# Patient Record
Sex: MALE | Race: WHITE | Employment: OTHER | URBAN - NONMETROPOLITAN AREA
[De-identification: names, ages, dates, MRNs, and addresses within clinical notes are randomized per-mention and may not be internally consistent; named-entity substitution may affect disease eponyms.]

---

## 2016-12-22 PROCEDURE — G0179 MD RECERTIFICATION HHA PT: HCPCS | Performed by: FAMILY MEDICINE

## 2017-01-05 ENCOUNTER — TELEPHONE (OUTPATIENT)
Dept: FAMILY MEDICINE CLINIC | Age: 47
End: 2017-01-05

## 2017-01-05 DIAGNOSIS — L40.9 PSORIASIS: ICD-10-CM

## 2017-01-05 DIAGNOSIS — Q90.9 TRISOMY 21: Primary | ICD-10-CM

## 2017-01-05 DIAGNOSIS — L71.9 ROSACEA: ICD-10-CM

## 2017-01-05 DIAGNOSIS — L30.9 ECZEMA, UNSPECIFIED TYPE: ICD-10-CM

## 2017-02-20 PROCEDURE — G0179 MD RECERTIFICATION HHA PT: HCPCS | Performed by: FAMILY MEDICINE

## 2017-03-06 ENCOUNTER — TELEPHONE (OUTPATIENT)
Dept: FAMILY MEDICINE CLINIC | Age: 47
End: 2017-03-06

## 2017-03-08 ENCOUNTER — TELEPHONE (OUTPATIENT)
Dept: FAMILY MEDICINE CLINIC | Age: 47
End: 2017-03-08

## 2017-03-08 DIAGNOSIS — Q90.9 TRISOMY 21: Primary | ICD-10-CM

## 2019-10-07 ENCOUNTER — HOSPITAL ENCOUNTER (EMERGENCY)
Age: 49
Discharge: HOME | End: 2019-10-07
Attending: EMERGENCY MEDICINE
Payer: MEDICARE

## 2019-10-07 VITALS
TEMPERATURE: 97.5 F | OXYGEN SATURATION: 98 % | HEART RATE: 79 BPM | DIASTOLIC BLOOD PRESSURE: 72 MMHG | SYSTOLIC BLOOD PRESSURE: 121 MMHG | RESPIRATION RATE: 12 BRPM

## 2019-10-07 DIAGNOSIS — Z00.00 ENCOUNTER FOR PHYSICAL EXAMINATION: Primary | ICD-10-CM

## 2019-10-07 PROCEDURE — 99282 EMERGENCY DEPT VISIT SF MDM: CPT

## 2019-10-07 RX ORDER — MELALEUCA QUINQUENERVIA POLLEN 0.05 G/ML
INJECTION, SOLUTION SUBCUTANEOUS
COMMUNITY

## 2019-10-07 RX ORDER — LORATADINE 10 MG/1
10 CAPSULE, LIQUID FILLED ORAL DAILY
COMMUNITY

## 2019-10-07 RX ORDER — ASCORBIC ACID 500 MG
500 TABLET ORAL DAILY
COMMUNITY

## 2019-10-07 RX ORDER — DESONIDE 0.5 MG/G
CREAM TOPICAL 2 TIMES DAILY
COMMUNITY

## 2019-10-07 RX ORDER — PANTOPRAZOLE SODIUM 40 MG/1
40 TABLET, DELAYED RELEASE ORAL DAILY
COMMUNITY

## 2020-03-13 ENCOUNTER — HOSPITAL ENCOUNTER (OUTPATIENT)
Dept: AUDIOLOGY | Age: 50
Discharge: HOME | End: 2020-03-13
Payer: MEDICARE

## 2020-03-13 PROCEDURE — 92557 COMPREHENSIVE HEARING TEST: CPT | Performed by: AUDIOLOGIST

## 2020-03-13 PROCEDURE — 92567 TYMPANOMETRY: CPT | Performed by: AUDIOLOGIST

## 2020-03-13 PROCEDURE — V5014 HEARING AID REPAIR/MODIFYING: HCPCS | Performed by: AUDIOLOGIST

## 2020-03-13 NOTE — PROGRESS NOTES
AUDIOLOGICAL EVALUATION      REASON FOR TESTING:  Longstanding hearing loss- previously seen for hearing aids at [facility]. Wears IPICO 3 Series 30 Power Plus BTE hearing aids that were fitted 3/26/2015. Patient has not been wearing his hearing aids- earhooks fall off of the hearing aids. Earmolds are loose. Patient lives in a group home. Down Syndrome. OTOSCOPY: Very narrow external auditory canals for both ears. Possible cerumen deep in the EAC of the right ear. AUDIOGRAM        Reliability: Good  Audiometer Used:  GSI-61    COMMENTS: Severe to profound hearing loss for the right ear. Profound hearing loss for the left ear. Unmasked bone conduction thresholds suggest a possible conductive component for at least one ear. Could not test speech discrimination due to patient's articulation errors. Tympanometry revealed reduced middle ear compliance and slight negative middle ear pressure for the left ear. Tympanometry revealed a flat tympanogram for the right ear. RECOMMENDATION(S):   1- ENT consult for medical clearance for hearing aids/possible cerumen removal for the right ear. Today's results will be shared with the [facility] ENT APRN/ALEX to determine if an appointment will be needed with Dr. Coty Darling for an ear exam under microscope due to the size of the patient's ear canals. A same day appointment will be scheduled for bilateral earmold impressions following cerumen removal.  2- A listening check of the patient's hearing aids revealed normal output. Replaced earmold tubing. Will take the hearing aids to [facility] to see if the appropriate earhooks are in stock; if not, they will be ordered. Will contact the supervisor at [place] [id number]. to  the hearing aids from [facility] when they are ready. 3- Repeat audiogram and tympanogram following any medical intervention. Audiometry should be completed on an annual basis.

## 2020-03-13 NOTE — LETTER
[facility], [facility]  [address], [address]   Phone: [phone]    Parker Velez        March 13, 2020     [place]  [place] [address]    Patient: Ro Mahmood   MR Number: [id number]   YOB: 1970   Date of Visit: 3/13/2020       Dear Dr. Tripp Rothman:    Thank you for referring Shmuel Mahmood to me for evaluation. Below are the relevant portions of my assessment and plan of care. AUDIOLOGICAL EVALUATION      REASON FOR TESTING:  Longstanding hearing loss- previously seen for hearing aids at [facility]. Wears Evino 3 Series 30 Power Plus BTE hearing aids that were fitted 3/26/2015. Patient has not been wearing his hearing aids- earhooks fall off of the hearing aids. Earmolds are loose. Patient lives in a group home. Down Syndrome. OTOSCOPY: Very narrow external auditory canals for both ears. Possible cerumen deep in the EAC of the right ear. AUDIOGRAM        Reliability: Good  Audiometer Used:  GSI-61    COMMENTS: Severe to profound hearing loss for the right ear. Profound hearing loss for the left ear. Unmasked bone conduction thresholds suggest a possible conductive component for at least one ear. Could not test speech discrimination due to patient's articulation errors. Tympanometry revealed reduced middle ear compliance and slight negative middle ear pressure for the left ear. Tympanometry revealed a flat tympanogram for the right ear. RECOMMENDATION(S):   1- ENT consult for medical clearance for hearing aids/possible cerumen removal for the right ear. Today's results will be shared with the [facility] ENT APRN/ALEX to determine if an appointment will be needed with Dr. Danica Martinez for an ear exam under microscope due to the size of the patient's ear canals.  A same day appointment will be scheduled for bilateral earmold impressions following cerumen removal. 2- A listening check of the patient's hearing aids revealed normal output. Replaced earmold tubing. Will take the hearing aids to [facility] to see if the appropriate earhooks are in stock; if not, they will be ordered. Will contact the supervisor at [address] to  the hearing aids from [facility] when they are ready. 3- Repeat audiogram and tympanogram following any medical intervention. Audiometry should be completed on an annual basis. If you have questions, please do not hesitate to call me. I look forward to following Rubin along with you.     Sincerely,          Keira Menard

## 2020-03-17 ENCOUNTER — TELEPHONE (OUTPATIENT)
Dept: AUDIOLOGY | Age: 50
End: 2020-03-17

## 2020-06-25 ENCOUNTER — OFFICE VISIT (OUTPATIENT)
Dept: ENT CLINIC | Age: 50
End: 2020-06-25
Payer: MEDICARE

## 2020-06-25 ENCOUNTER — HOSPITAL ENCOUNTER (OUTPATIENT)
Dept: AUDIOLOGY | Age: 50
Discharge: HOME | End: 2020-06-25
Payer: MEDICARE

## 2020-06-25 VITALS
WEIGHT: 183.5 LBS | HEART RATE: 76 BPM | TEMPERATURE: 98.3 F | BODY MASS INDEX: 31.75 KG/M2 | SYSTOLIC BLOOD PRESSURE: 112 MMHG | DIASTOLIC BLOOD PRESSURE: 72 MMHG | RESPIRATION RATE: 16 BRPM

## 2020-06-25 PROCEDURE — 99203 OFFICE O/P NEW LOW 30 MIN: CPT | Performed by: OTOLARYNGOLOGY

## 2020-06-25 PROCEDURE — 9990000010 HC NO CHARGE VISIT: Performed by: AUDIOLOGIST

## 2020-06-25 PROCEDURE — 92504 EAR MICROSCOPY EXAMINATION: CPT | Performed by: OTOLARYNGOLOGY

## 2020-06-25 RX ORDER — ACETAMINOPHEN 500 MG
500 TABLET ORAL EVERY 6 HOURS PRN
COMMUNITY

## 2020-06-25 RX ORDER — CALCIUM CARBONATE 200(500)MG
2 TABLET,CHEWABLE ORAL PRN
COMMUNITY

## 2020-06-25 RX ORDER — ONDANSETRON HYDROCHLORIDE 8 MG/1
8 TABLET, FILM COATED ORAL EVERY 8 HOURS PRN
COMMUNITY

## 2020-06-25 RX ORDER — CHOLECALCIFEROL (VITAMIN D3) 125 MCG
5 CAPSULE ORAL DAILY
COMMUNITY

## 2020-06-25 RX ORDER — TRAZODONE HYDROCHLORIDE 150 MG/1
150 TABLET ORAL NIGHTLY
COMMUNITY

## 2020-06-25 ASSESSMENT — ENCOUNTER SYMPTOMS
VOICE CHANGE: 0
COUGH: 0
SHORTNESS OF BREATH: 0
TROUBLE SWALLOWING: 0
SORE THROAT: 0
CHEST TIGHTNESS: 0
RHINORRHEA: 0
CHOKING: 0
DIARRHEA: 0
STRIDOR: 0
WHEEZING: 0
ABDOMINAL PAIN: 0
SINUS PRESSURE: 0
COLOR CHANGE: 0
NAUSEA: 0
VOMITING: 0
APNEA: 0
FACIAL SWELLING: 0

## 2020-06-25 NOTE — PROGRESS NOTES
tablet Take 40 mg by mouth daily      vitamin C (ASCORBIC ACID) 500 MG tablet Take 500 mg by mouth daily      desonide (DESOWEN) 0.05 % cream Apply topically 2 times daily Apply topically 2 times daily.  Hydrocortisone ( PSORIASIS ANIT-ITCH EX) Apply topically      triamcinolone (KENALOG) 0.1 % ointment Apply topically 2 times daily Apply topically 2 times daily.  Melaleuca 1:20 SOLN Inject into the skin Applied to pimples. Topical ointment      levothyroxine (SYNTHROID) 50 MCG tablet Take 1 tablet by mouth Daily 30 tablet 11    Multiple Vitamins-Minerals (SYDNEY MULTIVITAMIN FOR MEN PO) Take  by mouth daily. Current Facility-Administered Medications   Medication Dose Route Frequency Provider Last Rate Last Dose    ondansetron (ZOFRAN-ODT) disintegrating tablet 8 mg  8 mg Oral Once GILES Ortega - CNP         Past Medical History:   Diagnosis Date    Eczema     History of hemorrhoids     Psoriasis     scalp mostly    Rosacea     [place] dermatologist    Trisomy 21       Past Surgical History:   Procedure Laterality Date    HEMORRHOID SURGERY  December 2004    Dr. Gautam Canjilon      x2    MIDDLE EAR SURGERY      TONSILLECTOMY       Family History   Problem Relation Age of Onset    Heart Disease Mother     High Blood Pressure Mother     Diabetes Mother     Thyroid Disease Mother      Social History     Tobacco Use    Smoking status: Never Smoker    Smokeless tobacco: Never Used   Substance Use Topics    Alcohol use: No     Alcohol/week: 0.0 standard drinks       Subjective:      Review of Systems   Constitutional: Negative for activity change, appetite change, chills, diaphoresis, fatigue, fever and unexpected weight change. HENT: Positive for hearing loss.  Negative for congestion, dental problem, ear discharge, ear pain, facial swelling, mouth sores, nosebleeds, postnasal drip, rhinorrhea, sinus pressure, sneezing, sore throat, tinnitus, trouble swallowing and clean at least the right ear. He would likely benefit from a canal plasty. Given the difficulty of that procedure, the patient's abnormal anatomy given his trisomy 24 and the impaired healing of trisomy 24, I would probably defer to an otologist.  He needs to stop the Cortisporin otic solution since it predisposes to fungal infections long-term. Return for evaluation of his cerumen impactions and possible cleaning         Charles R. Montie Lesch, MD    **This report has been created using voice recognition software. It may contain minor errors which are inherent in voicerecognition technology. **

## 2020-06-25 NOTE — PROGRESS NOTES
Patient was here today for ear cleaning with Dr. Laura Gilmore and FER with me following. Dr. Laura Gilmore was unable to remove the wax from the right ear. He recommended Debrox drops and will be seeing him in 10 days (7/7) to repeat cleaning. I informed his caregiver that it is okay to stop in to Audiology after his ear cleaning for earmold impression. I will work him in. Also, the caregiver and I discussed new hearing aids for more power and bluetooth connectivity.  I will submit Medicaid PA request.

## 2020-07-07 ENCOUNTER — OFFICE VISIT (OUTPATIENT)
Dept: ENT CLINIC | Age: 50
End: 2020-07-07
Payer: MEDICARE

## 2020-07-07 VITALS
HEART RATE: 70 BPM | SYSTOLIC BLOOD PRESSURE: 116 MMHG | BODY MASS INDEX: 31.83 KG/M2 | TEMPERATURE: 99.1 F | DIASTOLIC BLOOD PRESSURE: 82 MMHG | RESPIRATION RATE: 16 BRPM | WEIGHT: 184 LBS

## 2020-07-07 PROCEDURE — 99213 OFFICE O/P EST LOW 20 MIN: CPT | Performed by: OTOLARYNGOLOGY

## 2020-07-07 PROCEDURE — 92504 EAR MICROSCOPY EXAMINATION: CPT | Performed by: OTOLARYNGOLOGY

## 2020-07-07 ASSESSMENT — ENCOUNTER SYMPTOMS
TROUBLE SWALLOWING: 0
ABDOMINAL PAIN: 0
CHOKING: 0
FACIAL SWELLING: 0
NAUSEA: 0
CHEST TIGHTNESS: 0
COLOR CHANGE: 0
SORE THROAT: 0
SHORTNESS OF BREATH: 0
WHEEZING: 0
SINUS PRESSURE: 0
VOICE CHANGE: 0
VOMITING: 0
DIARRHEA: 0
APNEA: 0
COUGH: 0
STRIDOR: 0
RHINORRHEA: 0

## 2020-07-07 NOTE — LETTER
[address] [facility] and [address]  Phone: [phone]  Fax: [address], [place]        July 7, 2020    Jose Ganga  [address] [address]    Patient: David Mahmood   MR Number: [id number]   YOB: 1970   Date of Visit: 7/7/2020     Dear Ellis Riddle,    I recently saw your patient, Rubin LIRIANO March, regarding his ears. Making some progress on the right ear. The left ear canal is proven to be completely atretic. This must be further investigated. Please let me know if he have any old CTs anywhere of his head or temporal bones, or any information on the surgery on his right ear. Below are the relevant portions of my assessment and plan of care. Assessment & Plan   Diagnoses and all orders for this visit:     Diagnosis Orders   1. Stenosis of right external auditory canal     2. Bilateral impacted cerumen     3. Trisomy 21     4. Atresia of left external auditory canal         The findings were explained and his questions were answered. I suspect with time will be able to get the wax out of the right ear but the left ear is going to require surgical intervention, depending on findings on the temporal bone CT. We should get as much wax out of the right ear as possible before getting the scan. I will discuss it further on his return visit. He is to continue the Debrox twice a day in the right ear only, until return visit    Note that the left ear canal must be presumed to be full of wax and squamous debris, unless there is significant scar tissue completely obliterating the canal medially    Return in about 1 week (around 7/14/2020) for Follow-Up cerumen impactions, canal narrowing. If you have questions, please do not hesitate to call me. I look forward to following Rubin along with you.     Sincerely,          Brook Ny MD

## 2020-07-07 NOTE — PROGRESS NOTES
[facility] PROFESSIONAL SERVS  [facility] EAR, NOSE AND THROAT  [address] [place] [address]  Dept: [phone]  Dept Fax: [phone]  Loc: [phone]    Rubin Mahmood is a 48 y.o. male who was referred byNo ref. provider found for:  Chief Complaint   Patient presents with    Follow-up     Here for 10 day follow up to check ears   . HPI:     Eliseo Mahmood is a 48 y.o. male who presents today for removal of cerumen impactions after 10 days of bilateral installation of Debrox. His symptoms have not changed at all. History: Allergies   Allergen Reactions    Cephalosporins     Pcn [Penicillins]     Tomato      Current Outpatient Medications   Medication Sig Dispense Refill    traZODone (DESYREL) 150 MG tablet Take 150 mg by mouth nightly      melatonin 5 MG TABS tablet Take 5 mg by mouth daily      neomycin-polymyxin-hydrocortisone (CORTISPORIN) 3.5-02346-0 otic solution Place 4 drops into both ears 2 times daily      calcium carbonate (TUMS) 500 MG chewable tablet Take 2 tablets by mouth as needed for Heartburn      acetaminophen (TYLENOL) 500 MG tablet Take 500 mg by mouth every 6 hours as needed for Pain      ondansetron (ZOFRAN) 8 MG tablet Take 8 mg by mouth every 8 hours as needed for Nausea or Vomiting      Mouthwashes (BIOTENE DRY MOUTH MT) Take by mouth as needed      carbamide peroxide (DEBROX) 6.5 % otic solution Place 3 drops into both ears daily 1 Bottle 3    loratadine (CLARITIN) 10 MG capsule Take 10 mg by mouth daily      pantoprazole (PROTONIX) 40 MG tablet Take 40 mg by mouth daily      vitamin C (ASCORBIC ACID) 500 MG tablet Take 500 mg by mouth daily      desonide (DESOWEN) 0.05 % cream Apply topically 2 times daily Apply topically 2 times daily.  Hydrocortisone ( PSORIASIS ANIT-ITCH EX) Apply topically      triamcinolone (KENALOG) 0.1 % ointment Apply topically 2 times daily Apply topically 2 times daily.       Melaleuca 1:20 SOLN Inject into the skin Applied to pimples. Topical ointment      levothyroxine (SYNTHROID) 50 MCG tablet Take 1 tablet by mouth Daily 30 tablet 11    Multiple Vitamins-Minerals (SYDNEY MULTIVITAMIN FOR MEN PO) Take  by mouth daily. Current Facility-Administered Medications   Medication Dose Route Frequency Provider Last Rate Last Dose    ondansetron (ZOFRAN-ODT) disintegrating tablet 8 mg  8 mg Oral Once Dionicio Young, APRN - CNP         Past Medical History:   Diagnosis Date    Eczema     History of hemorrhoids     Psoriasis     scalp mostly    Rosacea     [place] dermatologist    Trisomy 21       Past Surgical History:   Procedure Laterality Date    HEMORRHOID SURGERY  December 2004    Dr. Marimar Sidhu      x2    MIDDLE EAR SURGERY      TONSILLECTOMY       Family History   Problem Relation Age of Onset    Heart Disease Mother     High Blood Pressure Mother     Diabetes Mother     Thyroid Disease Mother      Social History     Tobacco Use    Smoking status: Never Smoker    Smokeless tobacco: Never Used   Substance Use Topics    Alcohol use: No     Alcohol/week: 0.0 standard drinks       Subjective:      Review of Systems   Constitutional: Negative for activity change, appetite change, chills, diaphoresis, fatigue, fever and unexpected weight change. HENT: Negative for congestion, dental problem, ear discharge, ear pain, facial swelling, hearing loss, mouth sores, nosebleeds, postnasal drip, rhinorrhea, sinus pressure, sneezing, sore throat, tinnitus, trouble swallowing and voice change. Eyes: Negative for visual disturbance. Respiratory: Negative for apnea, cough, choking, chest tightness, shortness of breath, wheezing and stridor. Cardiovascular: Negative for chest pain, palpitations and leg swelling. Gastrointestinal: Negative for abdominal pain, diarrhea, nausea and vomiting. Endocrine: Negative for cold intolerance, heat intolerance, polydipsia and polyuria. Genitourinary: Negative for dysuria, enuresis and hematuria. Musculoskeletal: Negative for arthralgias, gait problem, neck pain and neck stiffness. Skin: Negative for color change and rash. Allergic/Immunologic: Negative for environmental allergies, food allergies and immunocompromised state. Neurological: Negative for dizziness, syncope, facial asymmetry, speech difficulty, light-headedness and headaches. Hematological: Negative for adenopathy. Does not bruise/bleed easily. Psychiatric/Behavioral: Negative for confusion and sleep disturbance. The patient is not nervous/anxious. Objective:   /82 (Site: Right Upper Arm, Position: Sitting)   Pulse 70   Temp 99.1 °F (37.3 °C)   Resp 16   Wt 184 lb (83.5 kg)   BMI 31.83 kg/m²     Physical Exam  Binocular microscopy performed on the left ear. There is no opening at the medial extent of the ear canal.  Based on relative distances between the 2 ears, the stenosis is really an atresia, approximately mid canal    Right ear is examined under the microscope. The narrowest point of the right ear canal is approximately the size of a #7 suction. Only a small amount of wax could be removed with suction. It will now be much more feasible to get the Debrox into the ear canal.  Until that wax was cleaned away, it was completely obstructed. Data:  All of the past medical history, past surgical history, family history,social history, allergies and current medications were reviewed with the patient. Assessment & Plan   Diagnoses and all orders for this visit:     Diagnosis Orders   1. Stenosis of right external auditory canal     2. Bilateral impacted cerumen     3. Trisomy 21     4. Atresia of left external auditory canal         The findings were explained and his questions were answered.   I suspect with time will be able to get the wax out of the right ear but the left ear is going to require surgical intervention, depending on findings on the temporal bone CT. We should get as much wax out of the right ear as possible before getting the scan. I will discuss it further on his return visit. He is to continue the Debrox twice a day in the right ear only, until return visit    Note that the left ear canal must be presumed to be full of wax and squamous debris, unless there is significant scar tissue completely obliterating the canal medially    Return in about 1 week (around 7/14/2020) for Follow-Up cerumen impactions, canal narrowing. Colin Santos. Lilibeth Morales MD    **This report has been created using voice recognition software. It may contain minor errors which are inherent in voicerecognition technology. **

## 2020-07-17 ENCOUNTER — OFFICE VISIT (OUTPATIENT)
Dept: ENT CLINIC | Age: 50
End: 2020-07-17
Payer: MEDICARE

## 2020-07-17 VITALS
RESPIRATION RATE: 16 BRPM | HEART RATE: 68 BPM | WEIGHT: 186 LBS | SYSTOLIC BLOOD PRESSURE: 116 MMHG | DIASTOLIC BLOOD PRESSURE: 64 MMHG | TEMPERATURE: 99.9 F | BODY MASS INDEX: 32.18 KG/M2

## 2020-07-17 PROCEDURE — 99213 OFFICE O/P EST LOW 20 MIN: CPT | Performed by: OTOLARYNGOLOGY

## 2020-07-17 PROCEDURE — 69210 REMOVE IMPACTED EAR WAX UNI: CPT | Performed by: OTOLARYNGOLOGY

## 2020-07-17 ASSESSMENT — ENCOUNTER SYMPTOMS
COLOR CHANGE: 0
WHEEZING: 0
NAUSEA: 0
COUGH: 0
SHORTNESS OF BREATH: 0
TROUBLE SWALLOWING: 0
APNEA: 0
CHEST TIGHTNESS: 0
CHOKING: 0
DIARRHEA: 0
STRIDOR: 0
VOICE CHANGE: 0
ABDOMINAL PAIN: 0
SORE THROAT: 0
FACIAL SWELLING: 0
SINUS PRESSURE: 0
RHINORRHEA: 0
VOMITING: 0

## 2020-07-17 NOTE — PROGRESS NOTES
[facility] PROFESSIONAL SERVS  [facility] EAR, NOSE AND THROAT  [address] [place] [address]  Dept: [phone]  Dept Fax: [phone]  Loc: [phone]    Rubin Mahmood is a 48 y.o. male who was referred byNo ref. provider found for:  Chief Complaint   Patient presents with    Follow-up     Patient is here for follow up for cerumen impaction. Woo Paulino HPI:     Rubin Mahmood is a 48 y.o. male who presents today for follow up cerumen impaction, canal narrowing. He did use the drops to soften his earwax. .      History: Allergies   Allergen Reactions    Cephalosporins     Pcn [Penicillins]     Tomato      Current Outpatient Medications   Medication Sig Dispense Refill    traZODone (DESYREL) 150 MG tablet Take 150 mg by mouth nightly      melatonin 5 MG TABS tablet Take 5 mg by mouth daily      neomycin-polymyxin-hydrocortisone (CORTISPORIN) 3.5-74055-6 otic solution Place 4 drops into both ears 2 times daily      calcium carbonate (TUMS) 500 MG chewable tablet Take 2 tablets by mouth as needed for Heartburn      acetaminophen (TYLENOL) 500 MG tablet Take 500 mg by mouth every 6 hours as needed for Pain      ondansetron (ZOFRAN) 8 MG tablet Take 8 mg by mouth every 8 hours as needed for Nausea or Vomiting      Mouthwashes (BIOTENE DRY MOUTH MT) Take by mouth as needed      carbamide peroxide (DEBROX) 6.5 % otic solution Place 3 drops into both ears daily 1 Bottle 3    loratadine (CLARITIN) 10 MG capsule Take 10 mg by mouth daily      pantoprazole (PROTONIX) 40 MG tablet Take 40 mg by mouth daily      vitamin C (ASCORBIC ACID) 500 MG tablet Take 500 mg by mouth daily      desonide (DESOWEN) 0.05 % cream Apply topically 2 times daily Apply topically 2 times daily.  Hydrocortisone ( PSORIASIS ANIT-ITCH EX) Apply topically      triamcinolone (KENALOG) 0.1 % ointment Apply topically 2 times daily Apply topically 2 times daily.       Melaleuca 1:20 SOLN Genitourinary: Negative for dysuria, enuresis and hematuria. Musculoskeletal: Negative for arthralgias, gait problem, neck pain and neck stiffness. Skin: Negative for color change and rash. Allergic/Immunologic: Negative for environmental allergies, food allergies and immunocompromised state. Neurological: Negative for dizziness, syncope, facial asymmetry, speech difficulty, light-headedness and headaches. Hematological: Negative for adenopathy. Does not bruise/bleed easily. Psychiatric/Behavioral: Negative for confusion and sleep disturbance. The patient is not nervous/anxious. Objective:     /64 (Site: Left Upper Arm, Position: Sitting)   Pulse 68   Temp 99.9 °F (37.7 °C) (Infrared)   Resp 16   Wt 186 lb (84.4 kg)   BMI 32.18 kg/m²     Physical Exam    Cerumen removal using operating microscope,   Under the operating microscope, the right ear was cleaned with wire loop, forceps and suction as needed. Patient tolerated it well. Findings: Incomplete removal.  The wax was densely adherent medially. The opening in the canal is roughly the diameter of a #7 French suction    Data:  All of the past medical history, past surgical history, family history,social history, allergies and current medications were reviewed with the patient. Assessment & Plan   Diagnoses and all orders for this visit:     Diagnosis Orders   1. Stenosis of both external auditory canals  MD REMOVAL IMPACTED CERUMEN INSTRUMENTATION UNILAT   2. Impacted cerumen of right ear  MD REMOVAL IMPACTED CERUMEN INSTRUMENTATION UNILAT   3. Atresia of left external auditory canal  MD REMOVAL IMPACTED CERUMEN INSTRUMENTATION UNILAT   4. Trisomy 21         The findings were explained and his questions were answered. Options were discussed including continuing the debrox while lying down 2 times a day. 1 week follow up. Krish HALL CMA (AAMA), am scribing for, and in the presence of Dr. Nancy Lugo.

## 2020-07-24 ENCOUNTER — OFFICE VISIT (OUTPATIENT)
Dept: ENT CLINIC | Age: 50
End: 2020-07-24
Payer: MEDICARE

## 2020-07-24 VITALS
TEMPERATURE: 98.1 F | HEART RATE: 72 BPM | RESPIRATION RATE: 16 BRPM | BODY MASS INDEX: 33.86 KG/M2 | HEIGHT: 62 IN | DIASTOLIC BLOOD PRESSURE: 68 MMHG | WEIGHT: 184 LBS | SYSTOLIC BLOOD PRESSURE: 110 MMHG

## 2020-07-24 PROCEDURE — 99213 OFFICE O/P EST LOW 20 MIN: CPT | Performed by: OTOLARYNGOLOGY

## 2020-07-24 PROCEDURE — 69210 REMOVE IMPACTED EAR WAX UNI: CPT | Performed by: OTOLARYNGOLOGY

## 2020-07-24 ASSESSMENT — ENCOUNTER SYMPTOMS
DIARRHEA: 0
SINUS PRESSURE: 0
CHOKING: 0
TROUBLE SWALLOWING: 0
ABDOMINAL PAIN: 0
WHEEZING: 0
RHINORRHEA: 0
STRIDOR: 0
NAUSEA: 0
FACIAL SWELLING: 0
SORE THROAT: 0
APNEA: 0
COLOR CHANGE: 0
COUGH: 0
VOMITING: 0
CHEST TIGHTNESS: 0
VOICE CHANGE: 0
SHORTNESS OF BREATH: 0

## 2020-07-24 NOTE — PROGRESS NOTES
[facility] PROFESSIONAL SERVS  [facility] EAR, NOSE AND THROAT  [address] [place] [address]  Dept: [phone]  Dept Fax: [phone]  Loc: [phone]    Rubin LIRIANO March is a 48 y.o. male who was referred byNo ref. provider found for:  Chief Complaint   Patient presents with    Follow-up     Patient is here for a 1 week follow up. Danya Shore HPI:     Desiree De La Vega March is a 48 y.o. male who presents today for 1 week follow up     He was using the Debrox in the right ear as instructed  History: Allergies   Allergen Reactions    Cephalosporins     Pcn [Penicillins]     Tomato      Current Outpatient Medications   Medication Sig Dispense Refill    traZODone (DESYREL) 150 MG tablet Take 150 mg by mouth nightly      melatonin 5 MG TABS tablet Take 5 mg by mouth daily      neomycin-polymyxin-hydrocortisone (CORTISPORIN) 3.5-23804-2 otic solution Place 4 drops into both ears 2 times daily      calcium carbonate (TUMS) 500 MG chewable tablet Take 2 tablets by mouth as needed for Heartburn      acetaminophen (TYLENOL) 500 MG tablet Take 500 mg by mouth every 6 hours as needed for Pain      ondansetron (ZOFRAN) 8 MG tablet Take 8 mg by mouth every 8 hours as needed for Nausea or Vomiting      Mouthwashes (BIOTENE DRY MOUTH MT) Take by mouth as needed      carbamide peroxide (DEBROX) 6.5 % otic solution Place 3 drops into both ears daily 1 Bottle 3    loratadine (CLARITIN) 10 MG capsule Take 10 mg by mouth daily      pantoprazole (PROTONIX) 40 MG tablet Take 40 mg by mouth daily      vitamin C (ASCORBIC ACID) 500 MG tablet Take 500 mg by mouth daily      desonide (DESOWEN) 0.05 % cream Apply topically 2 times daily Apply topically 2 times daily.  Hydrocortisone ( PSORIASIS ANIT-ITCH EX) Apply topically      triamcinolone (KENALOG) 0.1 % ointment Apply topically 2 times daily Apply topically 2 times daily.       Melaleuca 1:20 SOLN Inject into the skin Applied to pimples. Topical ointment      levothyroxine (SYNTHROID) 50 MCG tablet Take 1 tablet by mouth Daily 30 tablet 11    Multiple Vitamins-Minerals (SYDNEY MULTIVITAMIN FOR MEN PO) Take  by mouth daily. Current Facility-Administered Medications   Medication Dose Route Frequency Provider Last Rate Last Dose    ondansetron (ZOFRAN-ODT) disintegrating tablet 8 mg  8 mg Oral Once Jaclyn Mercury, APRN - CNP         Past Medical History:   Diagnosis Date    Eczema     History of hemorrhoids     Psoriasis     scalp mostly    Rosacea     [place] dermatologist    Trisomy 21       Past Surgical History:   Procedure Laterality Date    HEMORRHOID SURGERY  December 2004    Dr. Dionicio Pruitt      x2    MIDDLE EAR SURGERY      TONSILLECTOMY       Family History   Problem Relation Age of Onset    Heart Disease Mother     High Blood Pressure Mother     Diabetes Mother     Thyroid Disease Mother      Social History     Tobacco Use    Smoking status: Never Smoker    Smokeless tobacco: Never Used   Substance Use Topics    Alcohol use: No     Alcohol/week: 0.0 standard drinks       Subjective:       Review of Systems   Constitutional: Negative for activity change, appetite change, chills, diaphoresis, fatigue, fever and unexpected weight change. HENT: Negative for congestion, dental problem, ear discharge, ear pain, facial swelling, hearing loss, mouth sores, nosebleeds, postnasal drip, rhinorrhea, sinus pressure, sneezing, sore throat, tinnitus, trouble swallowing and voice change. Eyes: Negative for visual disturbance. Respiratory: Negative for apnea, cough, choking, chest tightness, shortness of breath, wheezing and stridor. Cardiovascular: Negative for chest pain, palpitations and leg swelling. Gastrointestinal: Negative for abdominal pain, diarrhea, nausea and vomiting. Endocrine: Negative for cold intolerance, heat intolerance, polydipsia and polyuria.    Genitourinary: Negative for dysuria, enuresis and hematuria. Musculoskeletal: Negative for arthralgias, gait problem, neck pain and neck stiffness. Skin: Negative for color change and rash. Allergic/Immunologic: Negative for environmental allergies, food allergies and immunocompromised state. Neurological: Negative for dizziness, syncope, facial asymmetry, speech difficulty, light-headedness and headaches. Hematological: Negative for adenopathy. Does not bruise/bleed easily. Psychiatric/Behavioral: Negative for confusion and sleep disturbance. The patient is not nervous/anxious. Objective:     /68 (Site: Left Upper Arm, Position: Sitting)   Pulse 72   Temp 98.1 °F (36.7 °C) (Infrared)   Resp 16   Ht 5' 1\" (1.549 m)   Wt 184 lb (83.5 kg)   BMI 34.77 kg/m²     Physical Exam   Complete atresia left ear canal.  Right ear canal has severe stenosis    Cerumen removal using operating microscope,   Under the operating microscope, the right ear was cleaned with  forceps and suction as needed. Patient tolerated it well. Findings: Severe stenosis of medial ear canal.  Not all the desquamated skin was removed. Data:  All of the past medical history, past surgical history, family history,social history, allergies and current medications were reviewed with the patient. Assessment & Plan   Diagnoses and all orders for this visit:     Diagnosis Orders   1. Stenosis of both external auditory canals  OH REMOVAL IMPACTED CERUMEN INSTRUMENTATION UNILAT    CT SELLA TURCICA WO CONTRAST   2. Stenosis of right external auditory canal     3. Atresia of left external auditory canal     4. Trisomy 21     5. Bilateral impacted cerumen  OH REMOVAL IMPACTED CERUMEN INSTRUMENTATION UNILAT       The findings were explained and his questions were answered. We have almost all the desquamated skin of the right ear, we need to pursue investigation for possible surgical repair of his left ear. I would be referring him to [facility] otology for this. Options were discussed including CT of temporal bones. Caregiver agreed. They may stop the Debrox for now. Return after testing       IDavie CMA ([facility]), am scribing for, and in the presence of Dr. Vaishnavi Watters. Electronically signed by Remington Alcaraz CMA ([facility]) on 7/24/20 at 1:38 PM EDT. (Please note that portions of this note were completed with a voice recognition program. Efforts were made to edit the dictations butoccasionally words are mis-transcribed.)    I agree to the above documentation placed by my scribe. I have personally evaluated this patient. Additional findings are as noted. I reviewed the scribe's note and agree with the documented findings and plan of care. Any areas of disagreement are corrected. I agree with the chief complaint, past medical history, past surgical history, allergies, medications, social and family history as documented unless otherwise noted below.      Electronically signed by Johnny Rodney MD on 7/24/2020 at 2:24 PM

## 2020-07-31 ENCOUNTER — HOSPITAL ENCOUNTER (OUTPATIENT)
Dept: CT IMAGING | Age: 50
Discharge: HOME | End: 2020-07-31
Payer: MEDICARE

## 2020-07-31 PROCEDURE — 70480 CT ORBIT/EAR/FOSSA W/O DYE: CPT

## 2020-08-04 ENCOUNTER — OFFICE VISIT (OUTPATIENT)
Dept: ENT CLINIC | Age: 50
End: 2020-08-04
Payer: MEDICARE

## 2020-08-04 VITALS
TEMPERATURE: 98.6 F | DIASTOLIC BLOOD PRESSURE: 70 MMHG | BODY MASS INDEX: 33.61 KG/M2 | SYSTOLIC BLOOD PRESSURE: 118 MMHG | WEIGHT: 185 LBS | HEART RATE: 68 BPM | RESPIRATION RATE: 16 BRPM

## 2020-08-04 PROCEDURE — 69210 REMOVE IMPACTED EAR WAX UNI: CPT | Performed by: OTOLARYNGOLOGY

## 2020-08-04 PROCEDURE — 99213 OFFICE O/P EST LOW 20 MIN: CPT | Performed by: OTOLARYNGOLOGY

## 2020-08-04 NOTE — LETTER
[address] [facility] and [address]  Phone: [phone]  Fax: [address], [place]        August 29, 2020    Gabriel Bowman  [address] [address] [address]    Patient: Marycarmen Garcia March   MR Number: [id number]   YOB: 1970   Date of Visit: 8/4/2020     Dear Gabriel Bowman,    I recently saw your patient, Rubin LIRIANO March, regarding his right external auditory canal stenosis and cerumen impaction, and left ear canal atresia. We finally got the rest of the wax out on the right side. We need to clean that ear at intervals indefinitely. As you can see below I have referred him to [facility] and take care of the minor time-bomb in the left medial ear canal    Below are the relevant portions of my assessment and plan of care. Assessment & Plan   Diagnoses and all orders for this visit:     Diagnosis Orders   1. Stenosis of right external auditory canal  Audiometry with tympanometry    External Referral To ENT   2. Atresia of left external auditory canal  Audiometry with tympanometry    External Referral To ENT   3. History of right mastoidectomy  Audiometry with tympanometry    External Referral To ENT   4. Impacted cerumen of right ear  [place] REMOVAL IMPACTED CERUMEN INSTRUMENTATION UNILAT       The findings were explained and his questions were answered. Need to monitor this right ear and keep it from reaccumulating cerumen and impairing his hearing and predisposing him to external otitis. As for the left ear, I suggested that they contact an otologist at [facility] regarding definitive surgery. There is trapped debris medial canal deep to the atresia. Top layer of skin is sloughing off constantly and this acts like a small neoplasm.   CT scan was quite abnormal.    Narrative    PROCEDURE: CT SELLA TURCICA WO CONTRAST         CLINICAL INFORMATION: Stenosis of right external auditory canal, Atresia of external auditory canal, Trisomy 21 . Progressive left-sided hearing loss.         COMPARISON: No prior study.         TECHNIQUE: Helical CT abdomen the temporal bones in axial and coronal planes with reformatted multiplanar reconstructions.          All CT scans at this facility use dose modulation, iterative reconstruction, and/or weight-based dosing when appropriate to reduce radiation dose to as low as reasonably achievable.         FINDINGS: There is canal wall down mastoidectomy on the right. There is soft tissue density material within the external auditory canal/mastoidectomy bowl extending into the middle ear. No middle ear ossicles are identified. The scutum is absent. There    appears to be a defect at the roof of the external auditory canal lateral to the lamina papyracea. The otic capsule appears intact. The vestibule, semicircular canals and cochlea are normal in appearance. The internal auditory canals patent. There are    calcifications at the posterior wall of the external auditory canal/mastoidectomy.         On the left side, external auditory canal is narrowed and completely opacified with soft tissue density material which extends into the middle ear and surrounds the middle ear ossicles. There appears to be erosive changes of the middle ear ossicles. The    scutum is eroded. The tegmen tympani appears intact. The otic capsule appears intact. The vestibule, semicircular canals and cochlea are normal in appearance. The internal auditory canal appears patent. Mastoid air cells are largely nonaerated. There is    soft tissue density material extending through the aditus ad antrum into the small and poorly opacified mastoid air cell.              Impression         1. Surgical changes from prior canal wall down mastoidectomy with residual soft tissue density material in the external auditory canal/mastoidectomy extending into the middle ear as evidence for cholesteatoma.  There are no middle ear ossicles on the    right. 2. Apparent defects in the roof of the external auditory canal on the right lateral to the lamina papyracea. 3. Congenital narrowing of the left external auditory canal with diffuse soft tissue density material filling the canal and left middle ear and extending into the partially developed left mastoid air cell as evidence for cholesteatoma. The middle ear    ossicles are encompassed with erosive changes. The scutum is also eroded.                        **This report has been created using voice recognition software. It may contain minor errors which are inherent in voice recognition technology. **         Final report electronically signed by Dr. Jhonny Castaneda MD on 7/31/2020 1:32 PM      We should check his right ear canal in approximately 2 to 3 months        If you have questions, please do not hesitate to call me. I look forward to following Rubin along with you.     Sincerely,          Garrie Primrose, MD

## 2020-08-04 NOTE — PROGRESS NOTES
[facility] PROFESSIONAL SERVS  [facility] EAR, NOSE AND THROAT  [address] [place] [address]  Dept: [phone]  Dept Fax: [phone]  Loc: [phone]    Rubin Mahmood is a 48 y.o. male who was referred byNo ref. provider found for:  Chief Complaint   Patient presents with    Follow-up     Here to review the CT scan   . HPI:     Wing Mahmood is a 48 y.o. male who presents today for review of the CT scan we obtained and also to continue to attempt to clear the right earwax. He reaches a certain point and then does not tolerate it. Also, as is common with Down syndrome, working in the ear canal causes a very vigorous cough from the referred sensations. .    History: Allergies   Allergen Reactions    Cephalosporins     Pcn [Penicillins]     Tomato      Current Outpatient Medications   Medication Sig Dispense Refill    traZODone (DESYREL) 150 MG tablet Take 150 mg by mouth nightly      melatonin 5 MG TABS tablet Take 5 mg by mouth daily      neomycin-polymyxin-hydrocortisone (CORTISPORIN) 3.5-89143-6 otic solution Place 4 drops into both ears 2 times daily      calcium carbonate (TUMS) 500 MG chewable tablet Take 2 tablets by mouth as needed for Heartburn      acetaminophen (TYLENOL) 500 MG tablet Take 500 mg by mouth every 6 hours as needed for Pain      ondansetron (ZOFRAN) 8 MG tablet Take 8 mg by mouth every 8 hours as needed for Nausea or Vomiting      Mouthwashes (BIOTENE DRY MOUTH MT) Take by mouth as needed      loratadine (CLARITIN) 10 MG capsule Take 10 mg by mouth daily      pantoprazole (PROTONIX) 40 MG tablet Take 40 mg by mouth daily      vitamin C (ASCORBIC ACID) 500 MG tablet Take 500 mg by mouth daily      desonide (DESOWEN) 0.05 % cream Apply topically 2 times daily Apply topically 2 times daily.       Hydrocortisone ( PSORIASIS ANIT-ITCH EX) Apply topically      triamcinolone (KENALOG) 0.1 % ointment Apply topically 2 times daily Apply topically 2 times daily.  Melaleuca 1:20 SOLN Inject into the skin Applied to pimples. Topical ointment      levothyroxine (SYNTHROID) 50 MCG tablet Take 1 tablet by mouth Daily 30 tablet 11    Multiple Vitamins-Minerals (SYDNEY MULTIVITAMIN FOR MEN PO) Take  by mouth daily. Current Facility-Administered Medications   Medication Dose Route Frequency Provider Last Rate Last Dose    ondansetron (ZOFRAN-ODT) disintegrating tablet 8 mg  8 mg Oral Once Georgia Balderas, APRN - CNP         Past Medical History:   Diagnosis Date    Eczema     History of hemorrhoids     Psoriasis     scalp mostly    Rosacea     [place] dermatologist    Trisomy 21       Past Surgical History:   Procedure Laterality Date    HEMORRHOID SURGERY  December 2004    Dr. Joshua Lamas      x2    MIDDLE EAR SURGERY      TONSILLECTOMY       Family History   Problem Relation Age of Onset    Heart Disease Mother     High Blood Pressure Mother     Diabetes Mother     Thyroid Disease Mother      Social History     Tobacco Use    Smoking status: Never Smoker    Smokeless tobacco: Never Used   Substance Use Topics    Alcohol use: No     Alcohol/week: 0.0 standard drinks       Subjective:      Review of Systems   All other systems reviewed and are negative. Objective:   /70 (Site: Left Upper Arm, Position: Sitting)   Pulse 68   Temp 98.6 °F (37 °C) (Infrared)   Resp 16   Wt 185 lb (83.9 kg)   BMI 33.61 kg/m²     Physical Exam   Ears: Partial cerumen impaction obstructing the medial canal still visible through the very tiny right external canal stenosis. Left external auditory canal appears scarred shut (atresia). Cerumen removal using operating microscope,   Under the operating microscope, the right ear was cleaned with wire loop, forceps and suction as needed. Patient tolerated it well.  Findings: Stenosis midportion of ear canal with accumulation of earwax, this time successfully removed almost 100%.      Data:  All of the past medical history, past surgical history, family history,social history, allergies and current medications were reviewed with the patient. Assessment & Plan   Diagnoses and all orders for this visit:     Diagnosis Orders   1. Stenosis of right external auditory canal  Audiometry with tympanometry    External Referral To ENT   2. Atresia of left external auditory canal  Audiometry with tympanometry    External Referral To ENT   3. History of right mastoidectomy  Audiometry with tympanometry    External Referral To ENT   4. Impacted cerumen of right ear  [place] REMOVAL IMPACTED CERUMEN INSTRUMENTATION UNILAT       The findings were explained and his questions were answered. Need to monitor this right ear and keep it from reaccumulating cerumen and impairing his hearing and predisposing him to external otitis. As for the left ear, I suggested that they contact an otologist at [facility] regarding definitive surgery. There is trapped debris medial canal deep to the atresia. Top layer of skin is sloughing off constantly and this acts like a small neoplasm. CT scan was quite abnormal.    Narrative    PROCEDURE: CT SELLA TURCICA WO CONTRAST         CLINICAL INFORMATION: Stenosis of right external auditory canal, Atresia of external auditory canal, Trisomy 21 . Progressive left-sided hearing loss.         COMPARISON: No prior study.         TECHNIQUE: Helical CT abdomen the temporal bones in axial and coronal planes with reformatted multiplanar reconstructions.          All CT scans at this facility use dose modulation, iterative reconstruction, and/or weight-based dosing when appropriate to reduce radiation dose to as low as reasonably achievable.         FINDINGS: There is canal wall down mastoidectomy on the right. There is soft tissue density material within the external auditory canal/mastoidectomy bowl extending into the middle ear. No middle ear ossicles are identified.  The scutum is absent. There    appears to be a defect at the roof of the external auditory canal lateral to the lamina papyracea. The otic capsule appears intact. The vestibule, semicircular canals and cochlea are normal in appearance. The internal auditory canals patent. There are    calcifications at the posterior wall of the external auditory canal/mastoidectomy.         On the left side, external auditory canal is narrowed and completely opacified with soft tissue density material which extends into the middle ear and surrounds the middle ear ossicles. There appears to be erosive changes of the middle ear ossicles. The    scutum is eroded. The tegmen tympani appears intact. The otic capsule appears intact. The vestibule, semicircular canals and cochlea are normal in appearance. The internal auditory canal appears patent. Mastoid air cells are largely nonaerated. There is    soft tissue density material extending through the aditus ad antrum into the small and poorly opacified mastoid air cell.              Impression         1. Surgical changes from prior canal wall down mastoidectomy with residual soft tissue density material in the external auditory canal/mastoidectomy extending into the middle ear as evidence for cholesteatoma. There are no middle ear ossicles on the    right. 2. Apparent defects in the roof of the external auditory canal on the right lateral to the lamina papyracea. 3. Congenital narrowing of the left external auditory canal with diffuse soft tissue density material filling the canal and left middle ear and extending into the partially developed left mastoid air cell as evidence for cholesteatoma. The middle ear    ossicles are encompassed with erosive changes. The scutum is also eroded.                        **This report has been created using voice recognition software. It may contain minor errors which are inherent in voice recognition technology. **         Final report electronically signed by Dr. Shahzad Levin MD on 7/31/2020 1:32 PM      We should check his right ear canal in approximately 2 to 3 months         Adriana Doe. Dali Damon MD    **This report has been created using voice recognition software. It may contain minor errors which are inherent in voicerecognition technology. **

## 2020-09-17 ENCOUNTER — HOSPITAL ENCOUNTER (OUTPATIENT)
Dept: AUDIOLOGY | Age: 50
Discharge: HOME | End: 2020-09-17
Payer: MEDICARE

## 2020-09-17 PROCEDURE — 92567 TYMPANOMETRY: CPT | Performed by: AUDIOLOGIST

## 2020-09-17 PROCEDURE — V5014 HEARING AID REPAIR/MODIFYING: HCPCS | Performed by: AUDIOLOGIST

## 2020-09-17 PROCEDURE — 92557 COMPREHENSIVE HEARING TEST: CPT | Performed by: AUDIOLOGIST

## 2020-09-17 NOTE — PROGRESS NOTES
AUDIOLOGICAL EVALUATION      REASON FOR TESTING:  Repeat audiometry and tympanometry due to ongoing issues with bilateral cerumen impaction and stenosis of both external auditory canals. Dr. Aris Diaz is referring the patient to [facility]. The patient was previously seen for hearing aids at [facility]. He wears Quri 3 Series 30 Power Plus BTE hearing aids that were fitted 3/26/2015. The patient's earmolds are very loose, but new impressions cannot be taken at this time due to the cerumen impaction and stenosis of both ear canals. The patient lives in a group home. He has Down's Syndrome. OTOSCOPY:  Very narrow external auditory canals for both ears. Cerumen in both ear canals. AUDIOGRAM        Reliability: Fair  Audiometer Used:  GSI-61    COMMENTS: Profound mixed hearing loss for both ears. The patient's air conduction thresholds have dropped significantly relative to the March 2020 audiogram. Could not evaluate speech audiometry due to limits of the equipment. Tympanometry revealed a flat tympanogram for the left ear. Could not obtain a seal for tympanometry of the right ear. HEARING AID CHECK: Both hearing aids had dead batteries and the earmold tubing was significantly plugged with cerumen. Replaced earmold tubing, earhooks and dead batteries in both hearing aids. A listening check revealed normal function. RECOMMENDATION(S):   1- Proceed with referral to [facility] as initiated by Dr. Aris Diaz. 2- Upon medical management of cerumen impaction/stenosis, the patient should be referred back to this facility for bilateral earmold impressions for new earmolds for his hearing aids. Prior authorization request will be submitted to [facility] for new behind-the-ear hearing aids. New amplification will provide additional gain needed for [facility] with better feedback management technology. 3- Repeat audiometry and tympanometry following any medical intervention.    4- The patient requires assistance with daily cleaning of his hearing aids and battery replacement. A copy of today's report will be mailed to [facility] to put a plan into place. She will also be contacted to schedule his hearing aid fitting with the new hearing aids have been approved and arrive.

## 2020-09-25 ENCOUNTER — TELEPHONE (OUTPATIENT)
Dept: AUDIOLOGY | Age: 50
End: 2020-09-25

## 2020-09-25 NOTE — TELEPHONE ENCOUNTER
Patient's new hearing aids have been ordered. Please call Renita Champagne at [facility]. . [phone]. to schedule HAF appointment. Thanks!

## 2020-10-16 ENCOUNTER — TELEPHONE (OUTPATIENT)
Dept: AUDIOLOGY | Age: 50
End: 2020-10-16

## 2020-10-16 NOTE — TELEPHONE ENCOUNTER
Spoke with Basil Reno today. She explained that Shameka Brown had an appt at [facility]. He will be seen again 11/2 for a three hour CI evaluation. He may also have a radical mastoidectomy on the right side. I will follow up with Basil Reno after 11/2 and we will see what is determined from that visit. Hearing aid authorization is good until 3/17/2021.

## 2020-10-20 ENCOUNTER — HOSPITAL ENCOUNTER (EMERGENCY)
Age: 50
Discharge: HOME | End: 2020-10-20
Attending: EMERGENCY MEDICINE
Payer: MEDICARE

## 2020-10-20 VITALS — TEMPERATURE: 98.3 F | WEIGHT: 181 LBS | BODY MASS INDEX: 32.89 KG/M2 | HEART RATE: 80 BPM | RESPIRATION RATE: 18 BRPM

## 2020-10-20 PROCEDURE — 99283 EMERGENCY DEPT VISIT LOW MDM: CPT

## 2020-10-20 PROCEDURE — 10060 I&D ABSCESS SIMPLE/SINGLE: CPT

## 2020-10-20 PROCEDURE — 87077 CULTURE AEROBIC IDENTIFY: CPT

## 2020-10-20 PROCEDURE — 87147 CULTURE TYPE IMMUNOLOGIC: CPT

## 2020-10-20 PROCEDURE — 87186 SC STD MICRODIL/AGAR DIL: CPT

## 2020-10-20 PROCEDURE — 2709999900 HC NON-CHARGEABLE SUPPLY

## 2020-10-20 PROCEDURE — 87205 SMEAR GRAM STAIN: CPT

## 2020-10-20 PROCEDURE — 6370000000 HC RX 637 (ALT 250 FOR IP): Performed by: EMERGENCY MEDICINE

## 2020-10-20 PROCEDURE — 87070 CULTURE OTHR SPECIMN AEROBIC: CPT

## 2020-10-20 RX ORDER — SULFAMETHOXAZOLE AND TRIMETHOPRIM 800; 160 MG/1; MG/1
1 TABLET ORAL ONCE
Status: COMPLETED | OUTPATIENT
Start: 2020-10-20 | End: 2020-10-20

## 2020-10-20 RX ORDER — SULFAMETHOXAZOLE AND TRIMETHOPRIM 800; 160 MG/1; MG/1
1 TABLET ORAL 2 TIMES DAILY
Qty: 20 TABLET | Refills: 0 | Status: SHIPPED | OUTPATIENT
Start: 2020-10-20 | End: 2020-10-30

## 2020-10-20 RX ADMIN — SULFAMETHOXAZOLE AND TRIMETHOPRIM 1 TABLET: 800; 160 TABLET ORAL at 18:08

## 2020-10-20 ASSESSMENT — PAIN DESCRIPTION - LOCATION: LOCATION: TOE (COMMENT WHICH ONE)

## 2020-10-20 NOTE — ED NOTES
Discharge instructions reviewed with patient's caregiver and questions answered. Dressing dry and intact to the right great toe. Patient states that the toe \"doesn't hurt now  Skin warm and dry, color normal for ethnicity. Remains alert and cooperative. Denies further questions or concerns at this time.      Romeo Ford RN  10/20/20 2053

## 2020-10-20 NOTE — ED NOTES
Wound dressed with sterile gauze and loose Coban to hold dressing in place. Caretaker advised of need to change dressing daily.      Kemal Mix RN  10/20/20 0957

## 2020-10-20 NOTE — ED NOTES
Right great toe I&D completed per Dr. Meño Sigala. Patient tolerated procedure well. Caretaker at bedside comforting patient.      Shay Thurston RN  10/20/20 0533

## 2020-10-20 NOTE — ED NOTES
Redness of the left great toe for several days. Worse today. Appears to have pus at the base of the nailbed. Patient is alert and cooperative. Skin warm and dry. Color normal for ethnicity. Caregiver here with patient.      Chon Manuel RN  10/20/20 5131

## 2020-10-20 NOTE — ED PROVIDER NOTES
[facility]  [address] [address] [facility]  Phone: [phone]    eMERGENCY dEPARTMENT eNCOUnter           [address] [facility]       Chief Complaint   Patient presents with    Wound Infection       Nurses Notes reviewed and I agree except as noted in the HPI. HISTORY OF PRESENT ILLNESS    Rubin Mahmood is a 48 y.o. male who presented via private vehicle. Chief complaint: Right great toe and foot swelling. Patient is mentally retarded who lives in a group home. He is accompanied by his caregiver. He presented with 2 days history of swelling and redness of his right great toe which spread to his right foot. He has no reported injury. There is no reported fever or vomiting. History is obtained from his caregiver. Patient cannot give any meaningful history due to his mental retardation. But the caregiver stated that he has no change in his general health. He continued to have some appetite and had for the same energy. REVIEW OF SYSTEMS     Review of Systems   Unable to perform ROS: Dementia           PAST MEDICAL HISTORY    has a past medical history of Eczema, History of hemorrhoids, Psoriasis, Rosacea, and Trisomy 21. SURGICAL HISTORY      has a past surgical history that includes Hemorrhoid surgery (December 2004); Tonsillectomy; Middle ear surgery; and hernia repair. CURRENT MEDICATIONS       Previous Medications    ACETAMINOPHEN (TYLENOL) 500 MG TABLET    Take 500 mg by mouth every 6 hours as needed for Pain    CALCIUM CARBONATE (TUMS) 500 MG CHEWABLE TABLET    Take 2 tablets by mouth as needed for Heartburn    DESONIDE (DESOWEN) 0.05 % CREAM    Apply topically 2 times daily Apply topically 2 times daily.     HYDROCORTISONE ( PSORIASIS ANIT-ITCH EX)    Apply topically    LEVOTHYROXINE (SYNTHROID) 50 MCG TABLET    Take 1 tablet by mouth Daily    LORATADINE (CLARITIN) 10 MG CAPSULE    Take 10 mg by mouth daily    MELALEUCA 1:20 SOLN    Inject into the skin Applied to pimples. Topical ointment    MELATONIN 5 MG TABS TABLET    Take 5 mg by mouth daily    MOUTHWASHES (BIOTENE DRY MOUTH MT)    Take by mouth as needed    MULTIPLE VITAMINS-MINERALS (SYDNEY MULTIVITAMIN FOR MEN PO)    Take  by mouth daily. NEOMYCIN-POLYMYXIN-HYDROCORTISONE (CORTISPORIN) 3.5-28411-3 OTIC SOLUTION    Place 4 drops into both ears 2 times daily    ONDANSETRON (ZOFRAN) 8 MG TABLET    Take 8 mg by mouth every 8 hours as needed for Nausea or Vomiting    PANTOPRAZOLE (PROTONIX) 40 MG TABLET    Take 40 mg by mouth daily    TRAZODONE (DESYREL) 150 MG TABLET    Take 150 mg by mouth nightly    TRIAMCINOLONE (KENALOG) 0.1 % OINTMENT    Apply topically 2 times daily Apply topically 2 times daily. VITAMIN C (ASCORBIC ACID) 500 MG TABLET    Take 500 mg by mouth daily       ALLERGIES     is allergic to cephalosporins; pcn [penicillins]; and tomato. FAMILY HISTORY     He indicated that the status of his mother is unknown.   family history includes Diabetes in his mother; Heart Disease in his mother; High Blood Pressure in his mother; Thyroid Disease in his mother. SOCIAL HISTORY      reports that he has never smoked. He has never used smokeless tobacco. He reports that he does not drink alcohol or use drugs. PHYSICAL EXAM     INITIAL VITALS:  weight is 181 lb (82.1 kg). His temperature is 98.3 °F (36.8 °C). His pulse is 80. His respiration is 18. Physical Exam   Constitutional:   His facies are compatible with Down syndrome. He is awake alert, does not appear to be in distress. Cardiovascular: Normal rate and regular rhythm. Pulmonary/Chest: Effort normal and breath sounds normal.   Musculoskeletal:      Comments: Semination of the right foot showed mild swelling and erythema involving the dorsum of the foot. His right great toe is moderately swollen and red. There is 1 cm wide paronychia involving the proximal nail fold and extending to the lateral aspect of the nail.   He has normal sensorimotor examination and normal pedal pulses of the right foot. DIFFERENTIAL DIAGNOSIS:       DIAGNOSTIC RESULTS         LABS:   Labs Reviewed   CULTURE, AEROBIC       EMERGENCY DEPARTMENT COURSE:   Vitals:    Vitals:    10/20/20 1709   Pulse: 80   Resp: 18   Temp: 98.3 °F (36.8 °C)   Weight: 181 lb (82.1 kg)   Patient received Bactrim DS, PO  Procedures:  I &D of right great toe paronychia:  Verbal consent was obtained from his caregiver. Topical anesthesia was achieved with 2 mL of 1% lidocaine without epinephrine which was injected at the proximal nail fold. Using 11 size blade, I made 1.5 cm incision longitudinal to the proximal nail fold. Moderate amount of purulence was drained. I irrigated with saline. I packed the wound with gauze and Betadine. Patient tolerated the procedure well. Culture was obtained. FINAL IMPRESSION      1. Paronychia of great toe of right foot    2. Cellulitis and abscess of toe of right foot          DISPOSITION/PLAN   He was discharged to his caregiver in good condition. Discharge instructions were discussed with his caregiver.     PATIENT REFERRED TO:  Susanna Estrella DPM  [address] [facility] [id number] [phone]    In 1 day        DISCHARGE MEDICATIONS:  New Prescriptions    SULFAMETHOXAZOLE-TRIMETHOPRIM (BACTRIM DS) 800-160 MG PER TABLET    Take 1 tablet by mouth 2 times daily for 10 days       (Please note that portions of this note were completed with a voice recognition program.  Efforts were made to edit the dictations but occasionally words are mis-transcribed.)    MD Shanna Jaimes MD  10/20/20 2821

## 2020-10-22 LAB
AEROBIC CULTURE: ABNORMAL
GRAM STAIN RESULT: ABNORMAL
ORGANISM: ABNORMAL

## 2020-11-03 NOTE — TELEPHONE ENCOUNTER
Spoke with Estephanie Mckeon today to follow up on CI eval. She has a Zoom meeting with Dr. Ana Ennis this Friday. They are considering an osseointegrated device ([employer] by [employer]). I will call back next Monday 11/9 to see how Friday's meeting goes. Sent the BTEs back to [facility] for credit to be safe. Will reorder if we end up going with traditional amplification.

## 2020-11-09 NOTE — TELEPHONE ENCOUNTER
Spoke with Kamari Cole today- per Friday's tele visit, the plan is for Mingo Currie to trial a bone conduction headband. If he is doing well with that, then they are planning to do surgery on his ears to clean out the infection and then do an osseointegrated device. She will let me know if anything else is needed from me.

## 2020-12-23 ENCOUNTER — HOSPITAL ENCOUNTER (OUTPATIENT)
Dept: CT IMAGING | Age: 50
Discharge: HOME | End: 2020-12-23
Payer: MEDICARE

## 2020-12-23 PROCEDURE — 70480 CT ORBIT/EAR/FOSSA W/O DYE: CPT

## 2021-07-17 ENCOUNTER — HOSPITAL ENCOUNTER (OUTPATIENT)
Age: 51
Discharge: HOME | End: 2021-07-17
Payer: MEDICARE

## 2021-07-17 LAB
INFLUENZA A: NOT DETECTED
INFLUENZA B: NOT DETECTED
SARS-COV-2 RNA, RT PCR: NOT DETECTED

## 2021-07-17 PROCEDURE — 87636 SARSCOV2 & INF A&B AMP PRB: CPT

## 2021-09-09 ENCOUNTER — HOSPITAL ENCOUNTER (OUTPATIENT)
Dept: AUDIOLOGY | Age: 51
Discharge: HOME | End: 2021-09-09
Payer: MEDICARE

## 2021-09-09 PROCEDURE — 92557 COMPREHENSIVE HEARING TEST: CPT | Performed by: AUDIOLOGIST

## 2021-09-09 NOTE — PROGRESS NOTES
AUDIOLOGICAL EVALUATION      REASON FOR TESTING:  Audiometric evaluation per the request of Dr. Lazara Veloz, due to the diagnosis of mixed hearing loss of both ears. The patient received a cochlear implant for the left ear and had canal wall down mastoidectomy for the right ear on 7/22/2021. He is here for evaluation to pursue a hearing aid for the right ear. Down's Syndrome- lives in group home. OTOSCOPY: Surgically altered ear canal for the right ear. AUDIOGRAM        Reliability: Good  Audiometer Used:  [id number]    COMMENTS: Profound, rising to moderately-severe mixed hearing loss for the right ear. Unable to test the left ear due to use of cochlear implant. Thresholds are improved for the right ear relative to 9/17/2020 audiometry. RECOMMENDATION(S):   1- Amplification is recommended for the right ear. Medical clearance form will be sent to . Once medical clearance is received, then prior authorization request will be submitted to [facility] for a behind-the-ear hearing aid for the right ear. Took earmold impression of the right ear today without incident. Velma Muller will be contacted to schedule a hearing aid fitting when the hearing aid arrives. 2- Annual audiometry for monitoring purposes.

## 2021-09-21 ENCOUNTER — TELEPHONE (OUTPATIENT)
Dept: AUDIOLOGY | Age: 51
End: 2021-09-21

## 2021-09-21 NOTE — TELEPHONE ENCOUNTER
Medicaid approved new hearing aid. The earmold is in and the hearing aid will be arriving soon, if not already. CM- please call Jero Guardado at [facility] to schedule HAF appointment. Thanks!

## 2021-10-05 ENCOUNTER — HOSPITAL ENCOUNTER (OUTPATIENT)
Dept: AUDIOLOGY | Age: 51
Discharge: HOME | End: 2021-10-05
Payer: MEDICAID

## 2021-10-05 PROCEDURE — 9990000010 HC NO CHARGE VISIT: Performed by: AUDIOLOGIST

## 2021-10-05 NOTE — PROGRESS NOTES
Patient here for new hearing aid fitting (right). The patient's earmold is not fitting properly in his ear. Took a new earmold impression and ordered a new earmold from - asked them to add a helix lock. Will see for HAF on 10/22/21. Emailed Garima Rico at [employer] to inform her. The patient's BTE is in the fitting room cupboard.

## 2021-10-22 ENCOUNTER — HOSPITAL ENCOUNTER (OUTPATIENT)
Dept: AUDIOLOGY | Age: 51
Discharge: HOME | End: 2021-10-22
Payer: MEDICAID

## 2021-10-22 PROCEDURE — 9990000010 HC NO CHARGE VISIT: Performed by: AUDIOLOGIST

## 2021-10-22 NOTE — PROGRESS NOTES
Patient here for hearing aid fitting (with remade earmold). The initial earmold was a poor fit. This earmold is a poor fit as well (see photo below). Tried a power dome with slim tubing and this would not stay in his ear either. Took new earmold impression and sent the earmold back to  for a second remake. Sent photos to Pancho Hernandez at [facility] showing her the poor fit. Sent a photo of the ear without anything in it, with the impression in it and with the earmold in it for reference. Will contact Gisselle at [facility] to schedule next appointment.

## 2021-11-12 ENCOUNTER — HOSPITAL ENCOUNTER (OUTPATIENT)
Dept: AUDIOLOGY | Age: 51
Discharge: HOME | End: 2021-11-12
Payer: MEDICAID

## 2021-11-12 PROCEDURE — V5257 HEARING AID, DIGIT, MON, BTE: HCPCS | Performed by: AUDIOLOGIST

## 2021-11-12 PROCEDURE — V5241 DISPENSING FEE, MONAURAL: HCPCS | Performed by: AUDIOLOGIST

## 2021-11-12 NOTE — PROGRESS NOTES
[facility]#: [id number]   ACCT#: [de-identified]    DIAGNOSIS: Mixed hearing loss for both ears. NEW HEARING AID FITTING: A Phonak Alise P30 UP BTE hearing aid was fit and dispensed for the right ear. The patient has been seen for the fitting on multiple visits, but the earmold fit was inappropriate due to the surgically altered ear canal. So far, this is the best fitting earmold. There is concern for the hearing aid possibly falling out if not inserted properly, so the patient will need assistance daily from staff with inserting his hearing aid. The earmold was trimmed and filed for a better fit and his caregiver who accompanied him to the appointment was given a lubricant Georgia Kansky) to help with insertion. In addition, an otoclip was attached to the BTE and clipped to the patient's headband that holds his cochlear implant magnet. The CI magnet did not appear to be sticking well to his head. I contacted Ihsan Youssef at [address] [address].- she is aware that the magnet is not sticking well. I asked her for the contact information for the CI managing audiologist at [facility]. Danna Stern is scheduled to see her next week. I will e-mail Alpa Birmingham (the CI audiologist) and explain my concerns. Ihsan Youssef at [address] [address]. will be contacted to schedule hearing aid fitting recheck. AIDED AUDIOGRAM- unable to complete accurate speech mapping due to the shape of the patient's surgically altered ear and fit of the earmold. Aided audiometry was completed with the cochlear implant in place on the left side and the hearing aid in the right ear. COMMENTS/RECOMMENDATIONS:  1- Fair functional gain with the cochlear implant for the left ear and BTE hearing aid for the right ear- notified managing audiologist at [facility], Yves Jackson, to let her know that the magnet does not seem to be sticking well and not sure of current benefit from CI. She obtained aided CI only scores of 30-50 dB in the soundfield.   2- Alpa Birmingham spoke to Dr. Venkat Wiley and he will be able to take an FER on 12/3/21. I will use that to order a new earmold. Will schedule follow up visit after I order FER.

## 2021-11-20 ENCOUNTER — HOSPITAL ENCOUNTER (OUTPATIENT)
Age: 51
Discharge: HOME | End: 2021-11-20
Payer: MEDICARE

## 2021-11-20 LAB
ALBUMIN SERPL-MCNC: 3.9 G/DL (ref 3.5–5.1)
ALP BLD-CCNC: 104 U/L (ref 38–126)
ALT SERPL-CCNC: 21 U/L (ref 11–66)
ANION GAP SERPL CALCULATED.3IONS-SCNC: 8 MEQ/L (ref 8–16)
AST SERPL-CCNC: 18 U/L (ref 5–40)
BILIRUB SERPL-MCNC: 0.8 MG/DL (ref 0.3–1.2)
BUN BLDV-MCNC: 15 MG/DL (ref 7–22)
CALCIUM SERPL-MCNC: 9.2 MG/DL (ref 8.5–10.5)
CHLORIDE BLD-SCNC: 100 MEQ/L (ref 98–111)
CHOLESTEROL, FASTING: 105 MG/DL (ref 100–199)
CO2: 31 MEQ/L (ref 23–33)
CREAT SERPL-MCNC: 0.8 MG/DL (ref 0.4–1.2)
GFR SERPL CREATININE-BSD FRML MDRD: > 90 ML/MIN/1.73M2
GLUCOSE FASTING: 86 MG/DL (ref 70–108)
HDLC SERPL-MCNC: 31 MG/DL
LDL CHOLESTEROL CALCULATED: 58 MG/DL
POTASSIUM SERPL-SCNC: 3.9 MEQ/L (ref 3.5–5.2)
SODIUM BLD-SCNC: 139 MEQ/L (ref 135–145)
TOTAL PROTEIN: 7.3 G/DL (ref 6.1–8)
TRIGLYCERIDE, FASTING: 79 MG/DL (ref 0–199)

## 2021-11-20 PROCEDURE — 80061 LIPID PANEL: CPT

## 2021-11-20 PROCEDURE — 36415 COLL VENOUS BLD VENIPUNCTURE: CPT

## 2021-11-20 PROCEDURE — 80053 COMPREHEN METABOLIC PANEL: CPT

## 2021-12-09 ENCOUNTER — TELEPHONE (OUTPATIENT)
Dept: AUDIOLOGY | Age: 51
End: 2021-12-09

## 2022-01-27 ENCOUNTER — TELEPHONE (OUTPATIENT)
Dept: AUDIOLOGY | Age: 52
End: 2022-01-27

## 2022-01-27 NOTE — TELEPHONE ENCOUNTER
The patient's remade earmold came in. I exchanged e-mails with Judie Villalta at [facility] to let her know that the earmold is in. She asked that you call her to schedule FER  appt please. Thanks!

## 2022-02-03 ENCOUNTER — HOSPITAL ENCOUNTER (EMERGENCY)
Age: 52
Discharge: HOME | End: 2022-02-03
Attending: EMERGENCY MEDICINE
Payer: MEDICARE

## 2022-02-03 VITALS
RESPIRATION RATE: 16 BRPM | HEART RATE: 84 BPM | HEIGHT: 62 IN | WEIGHT: 160 LBS | SYSTOLIC BLOOD PRESSURE: 103 MMHG | OXYGEN SATURATION: 97 % | TEMPERATURE: 97.8 F | BODY MASS INDEX: 29.44 KG/M2 | DIASTOLIC BLOOD PRESSURE: 84 MMHG

## 2022-02-03 DIAGNOSIS — S00.83XA CONTUSION OF FACE, INITIAL ENCOUNTER: Primary | ICD-10-CM

## 2022-02-03 DIAGNOSIS — R51.9 FACIAL PAIN: ICD-10-CM

## 2022-02-03 PROCEDURE — 99282 EMERGENCY DEPT VISIT SF MDM: CPT

## 2022-02-03 RX ORDER — CETIRIZINE HYDROCHLORIDE 10 MG/1
TABLET ORAL
COMMUNITY
Start: 2022-01-03

## 2022-02-03 RX ORDER — ATORVASTATIN CALCIUM 20 MG/1
TABLET, FILM COATED ORAL
COMMUNITY
Start: 2022-01-03

## 2022-02-03 ASSESSMENT — PAIN SCALES - GENERAL: PAINLEVEL_OUTOF10: 3
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
## 2022-02-03 NOTE — ED NOTES
Pt and caregiver given discharge instructions. Both verbalized understanding and pt left with caregiver. Pt in stable condition.        Raisa Garcia RN  02/03/22 6323

## 2022-02-14 ENCOUNTER — HOSPITAL ENCOUNTER (OUTPATIENT)
Dept: AUDIOLOGY | Age: 52
Discharge: HOME | End: 2022-02-14

## 2022-02-14 PROCEDURE — 9990000010 HC NO CHARGE VISIT: Performed by: AUDIOLOGIST

## 2022-02-22 ENCOUNTER — HOSPITAL ENCOUNTER (OUTPATIENT)
Dept: AUDIOLOGY | Age: 52
Discharge: HOME | End: 2022-02-22

## 2022-02-22 PROCEDURE — 9990000010 HC NO CHARGE VISIT: Performed by: AUDIOLOGIST

## 2022-02-22 NOTE — PROGRESS NOTES
EARMOLD : Dispensed new earmold for the right BTE. Fit is good. Reviewed proper insertion with his caregiver Rebecca. She recorded video of me inserting the hearing aid so that she can share it with any other caregivers who might help Dariela Parisher with insertion. He will need daily assistance with insertion and removal of the hearing aid. I also attached his CI processor. He complained of the sound being too loud with both the CI and BTE. I decreased the overall gain on the BTE, but he still said that it was loud at times. I will e-mail Anu Doty at [facility] to see if another appointment with her is needed now that he has the HA in the right ear. Scheduled annual hearing aid check for 11/28/22.

## 2022-08-02 ENCOUNTER — TELEPHONE (OUTPATIENT)
Dept: INTERNAL MEDICINE CLINIC | Age: 52
End: 2022-08-02

## 2022-08-02 ENCOUNTER — HOSPITAL ENCOUNTER (OUTPATIENT)
Dept: GENERAL RADIOLOGY | Age: 52
Discharge: HOME | End: 2022-08-02
Payer: MEDICARE

## 2022-08-02 VITALS
TEMPERATURE: 97.7 F | RESPIRATION RATE: 16 BRPM | OXYGEN SATURATION: 99 % | SYSTOLIC BLOOD PRESSURE: 122 MMHG | HEART RATE: 66 BPM | DIASTOLIC BLOOD PRESSURE: 67 MMHG

## 2022-08-02 DIAGNOSIS — U07.1 COVID: Primary | ICD-10-CM

## 2022-08-02 PROCEDURE — 6360000002 HC RX W HCPCS: Performed by: INTERNAL MEDICINE

## 2022-08-02 PROCEDURE — 96374 THER/PROPH/DIAG INJ IV PUSH: CPT

## 2022-08-02 RX ORDER — SODIUM CHLORIDE 9 MG/ML
5-250 INJECTION, SOLUTION INTRAVENOUS PRN
OUTPATIENT
Start: 2022-08-02

## 2022-08-02 RX ORDER — SODIUM CHLORIDE 9 MG/ML
INJECTION, SOLUTION INTRAVENOUS CONTINUOUS
OUTPATIENT
Start: 2022-08-02

## 2022-08-02 RX ORDER — BEBTELOVIMAB 87.5 MG/ML
175 INJECTION, SOLUTION INTRAVENOUS ONCE
Status: CANCELLED | OUTPATIENT
Start: 2022-08-02 | End: 2022-08-02

## 2022-08-02 RX ORDER — SODIUM CHLORIDE 0.9 % (FLUSH) 0.9 %
5-40 SYRINGE (ML) INJECTION PRN
OUTPATIENT
Start: 2022-08-02

## 2022-08-02 RX ORDER — ALBUTEROL SULFATE 90 UG/1
4 AEROSOL, METERED RESPIRATORY (INHALATION) PRN
OUTPATIENT
Start: 2022-08-02

## 2022-08-02 RX ORDER — ONDANSETRON 2 MG/ML
8 INJECTION INTRAMUSCULAR; INTRAVENOUS
OUTPATIENT
Start: 2022-08-02

## 2022-08-02 RX ORDER — ACETAMINOPHEN 325 MG/1
650 TABLET ORAL
OUTPATIENT
Start: 2022-08-02

## 2022-08-02 RX ORDER — BEBTELOVIMAB 87.5 MG/ML
175 INJECTION, SOLUTION INTRAVENOUS ONCE
Status: COMPLETED | OUTPATIENT
Start: 2022-08-02 | End: 2022-08-02

## 2022-08-02 RX ORDER — HEPARIN SODIUM (PORCINE) LOCK FLUSH IV SOLN 100 UNIT/ML 100 UNIT/ML
500 SOLUTION INTRAVENOUS PRN
OUTPATIENT
Start: 2022-08-02

## 2022-08-02 RX ORDER — DIPHENHYDRAMINE HYDROCHLORIDE 50 MG/ML
50 INJECTION INTRAMUSCULAR; INTRAVENOUS
OUTPATIENT
Start: 2022-08-02

## 2022-08-02 RX ADMIN — BEBTELOVIMAB 175 MG: 87.5 INJECTION, SOLUTION INTRAVENOUS at 13:06

## 2022-08-02 NOTE — ED NOTES
Pt. And caregiver informed we will need to continue to observe for another hour, voice understanding, call light in reach. TV remote given.       Alana Darling RN  08/02/22 7345

## 2022-08-02 NOTE — ED NOTES
No adverse reaction noted to medication. Pulse regular. Extremities warm. Respirations regular and quiet. Mucous membranes pink & moist. Alert and oriented times 3. No nausea or vomiting. Range of motion within patient's limits. Skin pink, warm and dry. Calm and cooperative. Released ambulatory in [address] [place] [address]. Cond. accompanied per caregiver.       Herb Jordan RN  08/02/22 1025

## 2022-08-02 NOTE — TELEPHONE ENCOUNTER
Notified of patient having Covid since 7/30. High risk with BMI 29 and Trisomy 21. Patient currently satting well on room air. Request was made for me to cosign for bebtelovimab. EUA read by provider to family and they request treatment. Per EUA, other alternative therapies such as paxlovid should be used first if available. Patients family declining paxlovid and prefers bebtelovimab. Since this is the only therapy available otherwise for this high risk patient will proceed with cosigning for infusion.      Electronically signed by Leigh Barron MD on 8/2/2022 at 8:37 AM

## 2022-08-02 NOTE — PROGRESS NOTES
Bebtevolimab ordered by a non-privileged provider  - Gamal Mcdermott NP     1. Vaccine status - vaccinated   2. Date of Positive SARS-CoV-2 test - 8/1/22  3. Symptom onset - 7/30/22  4. Criteria met for Bebtelovimab - Yes - Down's Syndrome, obesity  5. O2 sat on room air - 98% - not on oxygen  6. EUA and side effects have been reviewed either verbally or given to patient Yes by NP  7.  Provider phone # if questions arise - [phone]    Dr Oliva Cancer agreeable to co-signing

## 2022-08-02 NOTE — ED NOTES
Pt. Presents ambulatory to exam room 3 for outpt. IV bebtlevomab injection, pt. Caregiver voices pt.'s sister wants him to receive since he has Down's syndrome.  Pt. Agreeable to procedure, states, \"no blood\"     Filipe Roberson, FAISAL  08/02/22 7201

## 2022-10-21 ENCOUNTER — HOSPITAL ENCOUNTER (OUTPATIENT)
Age: 52
Discharge: HOME | End: 2022-10-21
Payer: MEDICARE

## 2022-10-21 LAB
ALBUMIN SERPL-MCNC: 3.8 G/DL (ref 3.5–5.1)
ALP BLD-CCNC: 95 U/L (ref 38–126)
ALT SERPL-CCNC: 18 U/L (ref 11–66)
ANION GAP SERPL CALCULATED.3IONS-SCNC: 6 MEQ/L (ref 8–16)
AST SERPL-CCNC: 16 U/L (ref 5–40)
BASOPHILS # BLD: 1.2 %
BASOPHILS ABSOLUTE: 0.1 THOU/MM3 (ref 0–0.1)
BILIRUB SERPL-MCNC: 0.7 MG/DL (ref 0.3–1.2)
BUN BLDV-MCNC: 20 MG/DL (ref 7–22)
CALCIUM SERPL-MCNC: 9.1 MG/DL (ref 8.5–10.5)
CHLORIDE BLD-SCNC: 103 MEQ/L (ref 98–111)
CHOLESTEROL, FASTING: 127 MG/DL (ref 100–199)
CO2: 32 MEQ/L (ref 23–33)
CREAT SERPL-MCNC: 0.9 MG/DL (ref 0.4–1.2)
EOSINOPHIL # BLD: 1.6 %
EOSINOPHILS ABSOLUTE: 0.1 THOU/MM3 (ref 0–0.4)
ERYTHROCYTE [DISTWIDTH] IN BLOOD BY AUTOMATED COUNT: 13.6 % (ref 11.5–14.5)
ERYTHROCYTE [DISTWIDTH] IN BLOOD BY AUTOMATED COUNT: 53.2 FL (ref 35–45)
GFR SERPL CREATININE-BSD FRML MDRD: > 60 ML/MIN/1.73M2
GLUCOSE BLD-MCNC: 87 MG/DL (ref 70–108)
HCT VFR BLD CALC: 46.8 % (ref 42–52)
HDLC SERPL-MCNC: 36 MG/DL
HEMOGLOBIN: 15.3 GM/DL (ref 14–18)
IMMATURE GRANS (ABS): 0.01 THOU/MM3 (ref 0–0.07)
IMMATURE GRANULOCYTES: 0.2 %
LDL CHOLESTEROL CALCULATED: 75 MG/DL
LYMPHOCYTES # BLD: 25.6 %
LYMPHOCYTES ABSOLUTE: 1.3 THOU/MM3 (ref 1–4.8)
MCH RBC QN AUTO: 34.2 PG (ref 26–33)
MCHC RBC AUTO-ENTMCNC: 32.7 GM/DL (ref 32.2–35.5)
MCV RBC AUTO: 104.7 FL (ref 80–94)
MONOCYTES # BLD: 9.6 %
MONOCYTES ABSOLUTE: 0.5 THOU/MM3 (ref 0.4–1.3)
NUCLEATED RED BLOOD CELLS: 0 /100 WBC
PLATELET # BLD: 172 THOU/MM3 (ref 130–400)
PMV BLD AUTO: 10.1 FL (ref 9.4–12.4)
POTASSIUM SERPL-SCNC: 4.3 MEQ/L (ref 3.5–5.2)
RBC # BLD: 4.47 MILL/MM3 (ref 4.7–6.1)
SEG NEUTROPHILS: 61.8 %
SEGMENTED NEUTROPHILS ABSOLUTE COUNT: 3.1 THOU/MM3 (ref 1.8–7.7)
SODIUM BLD-SCNC: 141 MEQ/L (ref 135–145)
TOTAL PROTEIN: 7.3 G/DL (ref 6.1–8)
TRIGLYCERIDE, FASTING: 80 MG/DL (ref 0–199)
TSH SERPL DL<=0.05 MIU/L-ACNC: 2.35 UIU/ML (ref 0.4–4.2)
WBC # BLD: 5 THOU/MM3 (ref 4.8–10.8)

## 2022-10-21 PROCEDURE — 84443 ASSAY THYROID STIM HORMONE: CPT

## 2022-10-21 PROCEDURE — 85025 COMPLETE CBC W/AUTO DIFF WBC: CPT

## 2022-10-21 PROCEDURE — 36415 COLL VENOUS BLD VENIPUNCTURE: CPT

## 2022-10-21 PROCEDURE — 80053 COMPREHEN METABOLIC PANEL: CPT

## 2022-10-21 PROCEDURE — 80061 LIPID PANEL: CPT

## 2022-11-28 ENCOUNTER — HOSPITAL ENCOUNTER (OUTPATIENT)
Dept: AUDIOLOGY | Age: 52
Discharge: HOME | End: 2022-11-28

## 2022-11-28 PROCEDURE — V5266 BATTERY FOR HEARING DEVICE: HCPCS | Performed by: AUDIOLOGIST

## 2022-11-28 NOTE — PROGRESS NOTES
ACCOUNT #: [de-identified]    DIAGNOSIS: Mixed hearing loss of both ears. ANNUAL HEARING AID CHECK: Otoscopy was WNL for the right ear. No EAC for the left ear. According to group home caregiver, patient is not wearing his hearing aid or his CI. She says that he does not wear the hearing aid because she is not with him as often and he does not always have someone with him who is well trained to insert the earmold. Battery door is not closing properly so a listening check could not be completed- sent hearing aid in for battery door repair. Replaced earmold tubing. Per caregiver's request, will call Jazmín Bentley to schedule HAPU when it comes in. Caregiver thinks patient may need hearing aid turned up. Dispensed 4 hearing aid batteries (billed to Medicaid).

## 2022-12-06 ENCOUNTER — TELEPHONE (OUTPATIENT)
Dept: AUDIOLOGY | Age: 52
End: 2022-12-06

## 2022-12-06 NOTE — TELEPHONE ENCOUNTER
Patient's hearing aid is back from repair. CM- please call Johnathan Taveras at [facility] to schedule hearing aid  appointment. Hearing aid may need turned up (per caregiver request). Thank you!

## 2022-12-09 ENCOUNTER — HOSPITAL ENCOUNTER (OUTPATIENT)
Dept: AUDIOLOGY | Age: 52
Discharge: HOME | End: 2022-12-09

## 2022-12-09 PROCEDURE — 9990000010 HC NO CHARGE VISIT: Performed by: AUDIOLOGIST

## 2022-12-09 NOTE — PROGRESS NOTES
HEARING AID : Patient picked up repaired right hearing aid and his earmold. Increased overall gain and MPO x 1- patient reported improvement in sound quality. Scheduled six month hearing aid check for 6/9/23.

## 2023-06-09 ENCOUNTER — HOSPITAL ENCOUNTER (OUTPATIENT)
Dept: AUDIOLOGY | Age: 53
Discharge: HOME | End: 2023-06-09

## 2023-06-09 PROCEDURE — 9990000010 HC NO CHARGE VISIT: Performed by: AUDIOLOGIST

## 2023-06-09 NOTE — PROGRESS NOTES
SIX MONTH HEARING AID CHECK: Otoscopy revealed a known surgically altered ear- clear canal and white TM. Replaced earmold tubing and earhook. Listening check of the hearing aid revealed normal function. The caregiver reported something about the \"frequency\" which I interpreted as the feedback that was present from the earmold not being inserted properly- explained this to her to make sure the earmold is  inserted properly every day. She also stated that the battery door is still not closing properly. It looks as though the battery was attempted to be put into hearing aid directly and then the door closed, which pushed in a piece of plastic on the battery door. Moved the plastic piece back into proper position and explained this to the caregiver as well. Will order the correct battery doors and call [facility] to replace it when they come in. Also,discussed an updated audiogram with the caregiver since he has not been tested since 9/9/21. The patient has been receiving care for his right ear with Dr. Moises Kc at [facility]. He had a fungus in the right ear and they have been using alcohol in the right ear, per caregiver report.

## 2023-07-21 ENCOUNTER — HOSPITAL ENCOUNTER (OUTPATIENT)
Dept: AUDIOLOGY | Age: 53
Discharge: HOME | End: 2023-07-21
Payer: MEDICARE

## 2023-07-21 PROCEDURE — 92557 COMPREHENSIVE HEARING TEST: CPT | Performed by: AUDIOLOGIST

## 2023-07-21 PROCEDURE — 92567 TYMPANOMETRY: CPT | Performed by: AUDIOLOGIST

## 2023-07-21 NOTE — PROGRESS NOTES
AUDIOLOGICAL EVALUATION      REASON FOR TESTING:  Audiometric evaluation per the request of Dr. Tom Keller, due to the diagnosis of hearing loss for hearing aid check and battery door replacement. Rubin was accompanied by one of his caregivers. Patient and his caregiver report drainage from the right ear and the tubing and tone hook had debris occluding them. Left ear has been implanted with a cochlear implant (CI) and had canal wall down mastoidectomy for the right ear on 7/22/21. They patient does not wear his CI. The patient lives in a group home. He has Down's Syndrome. OTOSCOPY: Right ear has been surgically altered and the external ear canal was filled with white debris. The left ear had no visible ear canal.    AUDIOGRAM          Reliability: Good    COMMENTS: Left ear was not tested due to stenosis and previously being implanted with a CI. Pure tone audiometry revealed a severe to profound mixed hearing loss for the right ear. Speech reception threshold was obtained at 100 dB, which is consistent with pure tone averages. Thresholds are remained stable for all frequencies except for 8000 Hz where it decreased for the right ear relative to 9/9/2021 audiometry. Tympanometry revealed a flat tympanogram with a normal ear canal volume indicating middle ear dysfunction or can be consistent with debris noted in auditory canal.     HEARING AID CHECK:   Hearing aid was cleaned and checked. Tubing, battery door, and tone hook was changed on hearing aid. A yellow tone hook was used to replace clear tone hook due to supply shortage. Given the significant debris noted in the tubing and earhook, the caregiver was given an earmold blower and was instructed to use it daily. RECOMMENDATION(S):   1- Follow up with Dr. Les Chairez is recommended regarding possible otitis externa/white debris. Caregiver will contact Dr. Sander Dykes office to schedule an appointment.    2- Repeat audiogram and tympanogram annually for

## 2023-10-20 ENCOUNTER — HOSPITAL ENCOUNTER (OUTPATIENT)
Dept: AUDIOLOGY | Age: 53
Discharge: HOME | End: 2023-10-20
Payer: MEDICAID

## 2023-10-20 PROCEDURE — V5266 BATTERY FOR HEARING DEVICE: HCPCS | Performed by: AUDIOLOGIST

## 2023-10-20 PROCEDURE — 9990000010 HC NO CHARGE VISIT: Performed by: AUDIOLOGIST

## 2023-10-20 NOTE — PROGRESS NOTES
ACCOUNT #: [de-identified]    DIAGNOSIS: Mixed hearing loss, bilateral.    ANNUAL HEARING AID CHECK: Otoscopy revealed white debris in the EAC of the right ear. Patient saw Dr. Jonh Carlisle 9/14/23. He is scheduled to go back 10/26/23. They have been using cream in his ear to treat infection. Some of the cream was visualized on the earmold. Replaced earmold tubing and earhook. Listening check of the hearing aid revealed normal output. Dispensed 4 batteries- billed to Medicaid. Scheduled three month hearing aid check for 1/19/24.

## 2023-12-05 ENCOUNTER — HOSPITAL ENCOUNTER (OUTPATIENT)
Dept: AUDIOLOGY | Age: 53
Discharge: HOME | End: 2023-12-05

## 2023-12-05 PROCEDURE — 9990000010 HC NO CHARGE VISIT: Performed by: AUDIOLOGIST

## 2023-12-05 NOTE — PROGRESS NOTES
HEARING AID PROBLEM: Patient earmold tubing came out of the earmold. Also, the hearing aid is producing static. Replaced earmold tubing and earhook. Vacuumed debris from battery contacts and cleaned with contact . A repeat listening check revealed appropriate output. Instructed the patient's caregiver on how the hearing aid should be removed appropriately, rather than pulling on the tubing. No charge visit.

## 2024-01-09 ENCOUNTER — HOSPITAL ENCOUNTER (OUTPATIENT)
Dept: AUDIOLOGY | Age: 54
Discharge: HOME | End: 2024-01-09

## 2024-01-09 PROCEDURE — 9990000010 HC NO CHARGE VISIT: Performed by: AUDIOLOGIST

## 2024-01-09 NOTE — PROGRESS NOTES
HEARING AID PROBLEM: Patient's hearing aid stopped working last night. Visual inspection revealed white debris in  port- vacuumed and output improved. Replaced earhook and tubing on the hearing aid as well. The patient's caregiver explained that they are putting cream on his ear in the morning, which is what appears to have been in the  port. I recommended that they put the cream on at night after they have removed the hearing aid for the day. Showed her how to pick debris out of the  if it happens again. Gave her extra earhooks as well. Recommended storing the hearing aid in the electronic dryer at night. Cancelled 1/19/24 appointment for tubing change. Scheduled 3 month hearing aid check for 4/8/24.

## 2024-04-10 ENCOUNTER — HOSPITAL ENCOUNTER (OUTPATIENT)
Dept: AUDIOLOGY | Age: 54
Discharge: HOME | End: 2024-04-10

## 2024-04-10 PROCEDURE — 9990000010 HC NO CHARGE VISIT: Performed by: AUDIOLOGIST

## 2024-04-10 NOTE — PROGRESS NOTES
HEARING AID CHECK: Otoscopy revealed slight cerumen in the EAC of the right ear.  Listening check of hearing aid revealed normal function. Replaced earmold tubing (with dry tube) and replaced earhook. Patient is still seeing Dr. Snell on routine basis for management of otitis externa of the right ear. Recommended to caregiver that an annual audiogram be completed with hearing aid check in July. Recommended follow up with Dr. Cash for referral.

## 2024-07-15 ENCOUNTER — HOSPITAL ENCOUNTER (OUTPATIENT)
Dept: AUDIOLOGY | Age: 54
Discharge: HOME | End: 2024-07-15
Payer: MEDICARE

## 2024-07-15 PROCEDURE — 92567 TYMPANOMETRY: CPT | Performed by: AUDIOLOGIST

## 2024-07-15 PROCEDURE — V5266 BATTERY FOR HEARING DEVICE: HCPCS | Performed by: AUDIOLOGIST

## 2024-07-15 PROCEDURE — 92557 COMPREHENSIVE HEARING TEST: CPT | Performed by: AUDIOLOGIST

## 2024-07-18 NOTE — PROGRESS NOTES
AUDIOLOGICAL EVALUATION      REASON FOR TESTING:  Audiometric evaluation per the request of Dr. Cash, due to the diagnosis of hearing loss, unspecified hearing loss type, unspecified laterality. Rubin has Down's Syndrome and lives in a group home. He was accompanied to today's appointment by one of his caregivers. No new concerns. Patient continues to see Dr. Snell every three months for management of debris in the right ear. Patient has surgery for cochlear implant (CI) of the left ear and had canal wall down mastoidectomy for the right ear on 7/22/21. The patient does not wear his CI. Previous audiometry, completed 7/21/23, revealed a severe to profound mixed hearing loss for the right ear.  A Speech Reception Threshold (SRT) was obtained at 100 dBHL. Tympanometry revealed a flat tympanogram with a normal ear canal volume indicating middle ear dysfunction or can be consistent with debris noted in auditory canal.     OTOSCOPY: Narrow EAC with debris for the right ear. Atresia of left EAC.     AUDIOGRAM      Reliability: Good    COMMENTS: Left ear was not tested due to atresia and previously being implanted with a CI. Pure tone audiometry revealed profound, rising to severe, mixed hearing loss for the right ear. A Speech Reception Threshold (SRT) of 95 dBHL is consistent with pure tone average. Some decline in bone and air conduction thresholds when compared to audiometry from 7/21/23. Tympanometry revealed a flat tympanogram with a normal ear canal volume indicating middle ear dysfunction or can be consistent with debris noted in auditory canal. Did not complete tympanometry on the left ear due to atresia.      HEARING AID CHECK:   Hearing aid was cleaned and checked. Replaced earmold tubing and earhook. A listening check of the hearing aid revealed normal output. Completed aided measures in the soundfield (see scanned image below), which revealed improved outcomes with amplification.       RECOMMENDATION(S):

## 2024-11-05 ENCOUNTER — HOSPITAL ENCOUNTER (OUTPATIENT)
Dept: AUDIOLOGY | Age: 54
Discharge: HOME | End: 2024-11-05

## 2024-11-05 PROCEDURE — 9990000010 HC NO CHARGE VISIT: Performed by: AUDIOLOGIST

## 2024-11-05 NOTE — PROGRESS NOTES
ANNUAL HEARING AID CHECK: Patient is developmentally delay and was accompanied to today's appointment by a caregiver from the group home where he lives. Earmold tubing was completely plugged with debris. Earmold had a foul smell. Hearing aid was cleaned and checked.  Replaced earmold tubing and earhook. The TRS tube was stuck in the earmold- removed successfully. A listening check of the hearing aid revealed normal output. Patient saw Dr. Snell this morning and his ear was cleaned. Caregiver wishes to scheduled three month HA check (same day with Dr. Snell) on 2/4/25. Strongly encouraged her to call for an earlier appointment if the tubing gets this plugged again.

## 2025-02-04 ENCOUNTER — HOSPITAL ENCOUNTER (OUTPATIENT)
Dept: AUDIOLOGY | Age: 55
Discharge: HOME | End: 2025-02-04

## 2025-02-04 PROCEDURE — V5267 HEARING AID SUP/ACCESS/DEV: HCPCS | Performed by: AUDIOLOGIST

## 2025-02-04 PROCEDURE — V5014 HEARING AID REPAIR/MODIFYING: HCPCS | Performed by: AUDIOLOGIST

## 2025-02-04 NOTE — PROGRESS NOTES
ACCOUNT #: [id number]    DIAGNOSIS: Sensorineural hearing loss of both ears.      HEARING AID CHECK: Patient had appointment with Dr. Snell today- ears were cleaned. Listening check of hearing aid revealed appropriate function. Replaced earhook (unfiltered) and earmold tubing. Cleaned earmold in [employer] cleaner. Scheduled hearing aid check/earmold tubing change for 4/15/25 (same day as appointment with Dr. Snell again). Dispensed [employer] for electronic dryer. Billed $10.00 for drink brinks and $25.00 for clean/check/tubing change.

## 2025-04-15 ENCOUNTER — HOSPITAL ENCOUNTER (OUTPATIENT)
Dept: AUDIOLOGY | Age: 55
Discharge: HOME | End: 2025-04-15

## 2025-04-15 PROCEDURE — 92592 HC HEARING AID CHECK, ONE EAR: CPT | Performed by: AUDIOLOGIST

## 2025-04-15 NOTE — PROGRESS NOTES
ACCOUNT #: [id number]    DIAGNOSIS: Sensorineural hearing loss of both ears.    TWO MONTH HEARING AID CHECK: Otoscopy was clear for the right ear. Ear was cleaned by Dr. Snell today. Listening check of hearing aid revealed appropriate function. Replaced earhook and earmold tubing (dry tubing). Replaced broken battery door. Caregiver reported excessive battery drain. Per her inquiry, explained that he is not eligible for a new hearing aid through Medicaid until November of 2026. Scheduled audiogram and hearing aid check for 6/26/25. Ears will be cleaned by Dr. Snell on 6/24, but I did not have any openings for audiogram. Billed $25.00 for today's clean/check.

## 2025-07-15 ENCOUNTER — HOSPITAL ENCOUNTER (OUTPATIENT)
Dept: AUDIOLOGY | Age: 55
Discharge: HOME | End: 2025-07-15
Payer: MEDICARE

## 2025-07-15 PROCEDURE — V5266 BATTERY FOR HEARING DEVICE: HCPCS | Performed by: AUDIOLOGIST

## 2025-07-15 PROCEDURE — 92592 HC HEARING AID CHECK, ONE EAR: CPT | Performed by: AUDIOLOGIST

## 2025-07-15 PROCEDURE — 92557 COMPREHENSIVE HEARING TEST: CPT | Performed by: AUDIOLOGIST

## 2025-07-15 PROCEDURE — 92567 TYMPANOMETRY: CPT | Performed by: AUDIOLOGIST

## 2025-07-15 NOTE — PROGRESS NOTES
AUDIOLOGICAL EVALUATION      REASON FOR TESTING:  Audiometric evaluation per the request of Dr. Snell, due to the diagnosis of hearing loss, unspecified hearing loss type and unspecified laterality. Rubin has Down's Syndrome and lives in a group home. He was accompanied to today's appointment by one of his caregivers. No new concerns. Patient continues to see Dr. Snell every three months for management of debris/infection in the right ear. Patient had surgery for cochlear implant (CI) of the left ear and had canal wall down mastoidectomy for the right ear on 7/22/21. The patient does not wear his CI processor. Currently wears Phonak P30 UP BTE hearing aid in the right ear with custom earmold. The hearing aid was fitted 11/12/2021.    Previous results on 7/15/24: Left ear was not tested due to atresia and previously being implanted with a CI. Pure tone audiometry revealed profound, rising to severe, mixed hearing loss for the right ear. A Speech Reception Threshold (SRT) of 95 dBHL is consistent with pure tone average. Some decline in bone and air conduction thresholds when compared to audiometry from 7/21/23. Tympanometry revealed a flat tympanogram with a normal ear canal volume indicating middle ear dysfunction or can be consistent with debris noted in auditory canal. Did not complete tympanometry on the left ear due to atresia.     OTOSCOPY: Surgically altered ear canal- free of any debris. Atresia of the left EAC.    AUDIOGRAM        Reliability: Good    COMMENTS: Left ear was not tested due to atresia and previously being implanted with a CI. Pure tone audiometry revealed profound, rising to severe, mixed hearing loss for the right ear. Thresholds were confirmed with insert earphone.  A Speech Reception Threshold (SRT) of 100 dBHL is consistent with the pure tone average in the right ear. Word recognition ability is very poor at 40% in the right ear when presented at 110 dBHL. Tympanometry revealed a flat

## 2025-08-08 ENCOUNTER — HOSPITAL ENCOUNTER (OUTPATIENT)
Dept: AUDIOLOGY | Age: 55
Discharge: HOME | End: 2025-08-08
Payer: MEDICAID

## 2025-08-08 PROCEDURE — V5014 HEARING AID REPAIR/MODIFYING: HCPCS | Performed by: AUDIOLOGIST
